# Patient Record
Sex: MALE | Race: WHITE
[De-identification: names, ages, dates, MRNs, and addresses within clinical notes are randomized per-mention and may not be internally consistent; named-entity substitution may affect disease eponyms.]

---

## 2017-07-29 NOTE — UC
Throat Pain/Nasal Kennedy HPI





- HPI Summary


HPI Summary: 





THREE DAYS OF COUGH, SINUS CONGESTION AND LARYNGITIS. 





- History of Current Complaint


Chief Complaint: UCGeneralIllness


Stated Complaint: LARYNGITIS/CONGESTION


Time Seen by Provider: 07/28/17 18:13


Hx Obtained From: Patient


Onset/Duration: Gradual Onset, Lasting Days, Still Present


Severity: Moderate


Pain Intensity: 0


Pain Scale Used: 0-10 Numeric


Cough: Nonproductive


Associated Signs & Symptoms: Positive: Hoarseness





- Allergies/Home Medications


Allergies/Adverse Reactions: 


 Allergies











Allergy/AdvReac Type Severity Reaction Status Date / Time


 


Propafenone [From Rythmol] AdvReac  Dizziness Verified 07/28/17 18:19


 


dandelion resolved Allergy  Eyes Uncoded 07/28/17 18:19





   Itchy/Swollen/Red/Watery  














PMH/Surg Hx/FS Hx/Imm Hx


Previously Healthy: Yes





- Surgical History


Surgical History: Yes


Surgery Procedure, Year, and Place: 2013-Right MENISCUS REPAIR-2013 [place].  

2009- SURGERY BLADDER CANCER.  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  kidney stone 

removal





- Family History


Known Family History: Positive: Cardiac Disease, Hypertension





- Social History


Occupation: Retired


Lives: With Family


Alcohol Use: Daily


Alcohol Amount: 2 Martini drinks daily


Substance Use Type: None


Smoking Status (MU): Former Smoker


When Did the Patient Quit Smoking/Using Tobacco: 20+ years ago





- Immunization History


Most Recent Tetanus Shot: UNKNOWN





Review of Systems


Constitutional: Negative


Skin: Negative


Eyes: Negative


ENT: Sore Throat, Sinus Congestion


Respiratory: Cough


Cardiovascular: Negative


Gastrointestinal: Negative


Genitourinary: Negative


Motor: Negative


Neurovascular: Negative


Musculoskeletal: Negative


Neurological: Negative


Psychological: Negative


All Other Systems Reviewed And Are Negative: Yes





Physical Exam


Triage Information Reviewed: Yes


Appearance: Well-Appearing, No Pain Distress, Well-Nourished


Vital Signs: 


 Initial Vital Signs











Temp  99.8 F   07/28/17 18:10


 


Pulse  80   07/28/17 18:10


 


Resp  20   07/28/17 18:10


 


BP  174/76   07/28/17 18:10


 


Pulse Ox  94   07/28/17 18:10











Vital Signs Reviewed: Yes


Eye Exam: Normal


ENT: Positive: Pharyngeal erythema, TM dull


Dental Exam: Normal


Neck: Positive: Supple, Nontender, No Lymphadenopathy


Respiratory Exam: Other - COUGH


Respiratory: Positive: Chest non-tender, Lungs clear, Normal breath sounds, No 

respiratory distress


Cardiovascular Exam: Normal


Cardiovascular: Positive: RRR, Brisk Capillary Refill


Abdominal Exam: Normal


Musculoskeletal Exam: Normal


Neurological Exam: Normal


Psychological Exam: Normal


Skin Exam: Normal





Throat Pain/Nasal Course/Dx





- Differential Dx/Diagnosis


Differential Diagnosis/HQI/PQRI: Laryngitis, Pharyngitis, Sinusitis, URI


Provider Diagnoses: SINUSITIS; LARYNGITIS





Discharge





- Discharge Plan


Condition: Stable


Disposition: HOME


Prescriptions: 


Amoxicillin/Clavulanate TAB* [Augmentin *] 875 mg PO BID #20 tab


Benzonatate CAP* [Tessalon 100 MG CAP*] 100 mg PO TID PRN #15 cap


 PRN Reason: Cough


Patient Education Materials:  Sinusitis (ED), Laryngitis (ED), Acute Cough (ED)


Referrals: 


Yamileth Curiel MD [Primary Care Provider] -

## 2018-02-13 NOTE — RAD
Edited for charges.



INDICATION: Chest pain



COMPARISON: None

 

TECHNIQUE: SPECT imaging was performed. Rest images were acquired following the

intravenous injection of 10 millicuries of technetium 99m tetrofosmin at  1057 
hours. At 

1227 hours stress images were acquired following the intravenous administration 
of 25.38

millicuries of technetium 99m tetrofosmin.



The patient received intravenous Lexiscan prior to the stress image acquisition.



CT attenuation correction was not possible as the patient could not elevate 
arms above his

head.



FINDINGS: There are no defects of the stress-induced or fixed nature. The 
cardiac chamber

size is normal. There are no wall motion abnormalities. The ejection fraction is

calculated at 68% during stress.



IMPRESSION:  No scintigraphic evidence of ischemia or infarction. 



ASSESSMENT:  HIGH-RISK, INTERMEDIATE-RISK, OR LOW-RISK



Based on imaging criteria from ACC/AHA 2002 Guideline Update for the Management 
of

Patients With Chronic Stable Angina Table 23. Noninvasive Risk Stratification.



MTDD

## 2018-02-13 NOTE — RAD
HISTORY: Chest pain



COMPARISONS: July 18, 2017



VIEWS: 1: frontal portable view of the chest at 2:00 AM



FINDINGS:

LINES AND TUBES: None.

CARDIOMEDIASTINAL SILHOUETTE: The cardiac silhouette is enlarged. The cardiomediastinal

silhouette is otherwise normal for portable technique.

PLEURA: The costophrenic angles are sharp. No pleural abnormalities are noted.

LUNG PARENCHYMA: The lungs are clear.

ABDOMEN: The upper abdomen is clear. There is no subphrenic gas.

BONES AND SOFT TISSUES: No bone or soft tissue abnormalities are noted.



IMPRESSION: CARDIOMEGALY.

## 2018-02-13 NOTE — RAD
HISTORY: Lower extremity edema



COMPARISONS: None relevant



TECHNIQUE: Multiple transverse and longitudinal ultrasound images were obtained of the

bilateral lower extremities  from the level of the common femoral vein inferiorly through

to the infrapopliteal veins  using grayscale, color Doppler, and spectral Doppler imaging

with and without compression and with augmentation.    



FINDINGS:

VEINS: The venous system of the bilateral lower extremities  is compressible throughout

its course, with normal flow on color Doppler imaging and normal response to augmentation

on spectral Doppler imaging.



SOFT TISSUES: Unremarkable.



OTHER FINDINGS: None.



IMPRESSION: 

NO RIGHT LOWER EXTREMITY DEEP VEIN THROMBOSIS.

NO LEFT LOWER EXTREMITY DEEP VEIN THROMBOSIS

## 2018-02-13 NOTE — HP
CC:  Yamileth Curiel MD.

 

HISTORY AND PHYSICAL:

 

DATE OF ADMISSION:  18

 

TIME OF EVALUATION:  0400

 

PRIMARY CARE PHYSICIAN:  Yamileth Curiel MD.

 

CHIEF COMPLAINT:  Chest pain.

 

HISTORY OF PRESENT ILLNESS:  This is an 82-year-old male with past medical history of atrial fibrilla
tion, on Coumadin; hypertension, who presents to the emergency room with substernal epigastric pleuri
tic pain.  The patient states the symptoms began yesterday morning and has not unproved.  He states 2
 days prior to this, he has had congestion, postnasal drip with a mild cough.  No associated shortnes
s of breath.  No nausea, no diaphoresis.  He has an O2 sat at home, and it has been greater than 90. 
 He has had COPD, but he has no inhalers and no inhaler use.  He denies any change in his diet.  No i
ncrease in burping.  He was shoveling a few days ago, when the snow storm hit.  Otherwise, no exertio
nal activity.  No recent weight changes, no fevers, no recent changes in his medications.  The danny
t states he had a stress test here every year, but was not able to find that documentation other than
 one from .  Otherwise, remainder of review of systems negative.  In the emergency room, the kate
ent had labs, imaging.  He was given 4 mg of morphine, Zofran, aspirin, and was referred to the [facility] for further evaluation.

 

PAST MEDICAL HISTORY:

1.  Atrial fibrillation, on anticoagulation.

2.  Hypertension.

3.  COPD, on room air.

4.  History of bladder cancer, status post surgery in .

5.  Prostate cancer, status post surgery in .

6.  History of nephrolithiasis.

7.  History of right knee surgery.

 

MEDICATIONS:

1.  Multivitamin p.o. daily.

2.  Simvastatin 20 mg p.o. daily.

3.  Timolol maleate 0.25% one drop to each eye b.i.d. for glaucoma.

4.  Pilocarpine 0.5% one drop in both eyes once per month.

5.  Lisinopril 20 mg p.o. daily.

6.  Warfarin 2.5 mg Tuesday, Thursday, and Saturday; 1.25 mg on , Monday, Wednesday, Friday.

7.  Potassium chloride 20 mEq p.o. daily.

8.  Lasix 20 mg p.o. daily.

 

ALLERGIES:  PROPAFENONE, dizziness; DANDELION.

 

FAMILY HISTORY:  Mother  at age 73 from old age.  Father  at age 97 from emphysema.

 

SOCIAL HISTORY:  The patient states he recently relocated from [place].  He lives at home with his wi
fe who is his healthcare proxy.  He quit smoking 25 years ago, at that time he smoked 1 to 2 packs pe
r day for 25 years.  He has 1 martini vodka in the evening time.  Code status discussed, he is not mckay
re, will remain full code until he follows up with his primary to discuss further.

 

REVIEW OF SYSTEMS:  A 14-point review of systems as mentioned in the HPI, otherwise negative.

 

                               PHYSICAL EXAMINATION

 

GENERAL:  No acute distress, resting comfortably.

 

VITAL SIGNS:  Temp 98.3, pulse rate 87, respiratory rate 14, oxygen saturation 93% on 2 L, blood pres
sure 115/60.

 

HEENT:  Head:  Normocephalic.  Pupils are equal and reactive.  Anicteric. Oropharynx, mucous membrane
s moist.

 

NECK:  Supple.  No lymphadenopathy.

 

RESPIRATORY:  Diminished breath sounds.  No wheezing, rhonchi, or rales.

 

CARDIAC:  Irregularly irregular rate and rhythm.

 

ABDOMEN:  Soft, nontender, nondistended.

 

EXTREMITIES:  The patient with multiple lower extremity varicosities.  Distant pulses.  No edema.

 

NEUROLOGIC:  Alert and oriented x3.  No focal neurologic deficits.

 

 DIAGNOSTIC STUDIES/LAB DATA:  White count 9.3, hemoglobin 15.1, hematocrit 46, platelets 162.  INR 2
.4.  Sodium 134, potassium 4.2, chloride 99, bicarb 29, BUN 19, creatinine 0.92, glucose 151.  Tropon
in 0.01.

 

Radiographic data:  Chest x-ray, no acute findings on wet read.  EKG showed atrial fibrillation with 
a rate of 98.

 

ASSESSMENT AND PLAN:  This is an 82-year-old male with past medical history of atrial fibrillation, o
n Coumadin; hypertension, who presents to the emergency room with pleuritic pain.

 

1.  Pleuritic pain.  Assessment:  The patient's  initial workup is unremarkable. It is in the setting
 of congestion, could be costochondritis, or related to a viral syndrome.  He does have risk factors.
  It was unreasonable to rule him out for acute coronary syndrome.  Plan:  We will trend his troponin
.  Check a lipid panel. Place him on a baby aspirin. Order a nuclear stress test.

2.  Chronic medical problems:  Atrial fibrillation.  Assessment:  He is anticoagulated.  He is not on
 any medications for rate control.  We would recommend starting him on a beta-blocker after his stres
s test.

3.  Hypertension.  Continue his home regimen.

4.  FEN.  We will keep the patient n.p.o.

5.  DVT prophylaxis.  The patient is moderate risk.  He is on Coumadin.

6.  Code status, full code.  Recommend followup with his primary care physician regarding this.

 

PATIENT TIME:  Greater than 45 minutes spent doing the history and physical, more than half time spen
t in direct patient contact.

 

 

 

[id number]/CPS #: [id number]

## 2018-02-14 NOTE — DS
CC:  Taylor Gerber MD  *

 

DISCHARGE SUMMARY:

 

DATE OF ADMISSION:  02/13/18

 

DATE OF DISCHARGE:  02/13/18

 

ADMITTING PROVIDER:  Vanessa Wick MD

 

ATTENDING PHYSICIAN:  Kevin Hogan MD

 

PRIMARY CARE PROVIDER:  Yamileth Curiel MD

 

CHIEF COMPLAINT:  Pleuritic sharp chest pain.

 

PRINCIPAL DIAGNOSIS:  Acute coronary artery syndrome ruled out; pleuritic chest 
pain likely secondary to viral illness.

 

HISTORY OF PRESENT ILLNESS AND HOSPITAL COURSE:  Jorge Castañeda is an 82-
year- old male with past medical history of atrial fibrillation, on Coumadin; 
hypertension; COPD, on 2 L of oxygen at night; bladder cancer, status post 
surgery in 2009; prostate cancer, status post surgery in 1999; history of 
nephrolithiasis who presented with upper substernal pleuritic pain radiating to 
his upper back. Two days prior to admission, he had some congestion, postnasal 
drip, mild cough, fevers and chills.  No associated shortness of breath.  His 
O2 saturation has been greater than 98% at home when he measured it.  Despite 
his chronic hypoxic nocturnal respiratory failure, he is on no inhalers.  He 
was shoveling snow a few days prior to admission, otherwise no exertional 
activity, weight changes.  He was admitted for ACS rule out.  His initial chest 
x-ray showed no acute findings.  His initial troponin was 0.01.  It increased 
to 0.04, then 0.05, and down to 0.04 again.  He had a nuclear stress test, 
which showed no evidence of ischemia or infarction.  He had complaints of 
chronic right lower extremity edema and a duplex ultrasound bilaterally did not 
show any evidence of DVT.  His physical exam was more consistent with varicose 
veins, worse on the right leg.  Of note, his INR was in the therapeutic range, 
2.4.  He had a LDL of 74, HDL of 50, BNP of 163.  ACS having been ruled out and 
likely this is pleuritic pain secondary to his upper respiratory infections in 
recent days and possible viral insult.  The patient was considered stable for 
discharge with further followup with Dr. Curiel.

 

DISCHARGE MEDICATIONS:  Include:

1.  Acetaminophen 650 mg p.o. q.4 hours (new).

2.  Docusate 100 mg p.o. b.i.d. (new).

3.  Lasix 20 mg p.o. daily.

4.  Lisinopril 20 mg p.o. daily.

5.  Multivitamin 1 tab p.o. daily.

6.  Pilocarpine 0.5% ophthalmic solution 1 drop monthly.

9.  Potassium chloride tablet 20 mEq p.o. daily.

10.  Senna tab 1 tab p.o. b.i.d. p.r.n. (new).

11.  Simvastatin 20 mg p.o. q.a.m.

12.  Timolol 0.25% ophthalmic solution 1 drop both eyes b.i.d.

13.  Tramadol 25 mg p.o. q.6 hours p.r.n. (new).

14.  Warfarin 1.25 mg Sunday, Monday, Wednesday, Friday and 2.5 mg Tuesday, 
Thursday, Saturday.

 

DISCHARGE DIET:  Heart healthy.

 

ACTIVITY LEVEL:  No restrictions.

 

FOLLOWUP:  Please follow up with Dr. Yamileth Curiel within 5 days of discharge.

 

TIME SPENT:  On discharge 35 minutes.

 

 [id number]/CPS #: [id number]

MTDD

## 2018-07-12 NOTE — ED
Lower Extremity





- History of Current Complaint


Chief Complaint: UCLowerExtremity


Stated Complaint: RIGHT LEG COMPLAINT


Time Seen by Provider: 07/12/18 12:11


Hx Obtained From: Patient


Mechanism Of Injury: Unknown


Onset/Duration: Days


Severity Initially: Mild


Severity Currently: Mild


Pain Intensity: 0


Associated Signs And Symptoms: Positive: Negative


Aggravating Factor(s): Nothing


Alleviating Factor(s): Nothing





- Allergies/Home Medications


Allergies/Adverse Reactions: 


 Allergies











Allergy/AdvReac Type Severity Reaction Status Date / Time


 


propafenone Allergy  Dizziness Verified 02/13/18 03:18


 


dandelion resolved Allergy  Eyes Uncoded 07/28/17 18:19





   Itchy/Swollen/Red/Watery  











Home Medications: 


 Home Medications





Oxygen Machine At Night No. 2  07/12/18 [History]


Simvastatin 20 mg PO QAM 07/12/18 [History Confirmed 07/12/18]











PMH/Surg Hx/FS Hx/Imm Hx


Endocrine/Hematology History: 


   Denies: Hx Diabetes


Cardiovascular History: Reports: Hx Angina, Hx Coronary Artery Disease - HIGH 

CHOLESTEROL, Hx Peripheral Vascular Disease - MILD PER MATT NOTE


   Denies: Other Cardiovascular Problems/Disorders


   Comment Only: Hx Hypertension - CONTROLED BY MEDS


Respiratory History: Reports: Hx Chronic Obstructive Pulmonary Disease (COPD)


   Denies: Hx Asthma


 History: Reports: Hx Kidney Stones, Other  Problems/Disorders - DX BLADDER 

[place] 2009


Musculoskeletal History: Reports: Hx Arthritis - RIGHT KNEE


Sensory History: Reports: Hx Cataracts, Hx Contacts or Glasses - READING, Hx 

Glaucoma - BILATERAL, Hx Hearing Aid - LEFT EAR, Hx Hearing Problem


Opthamlomology History: Reports: Hx Cataracts, Hx Contacts or Glasses - READING

, Hx Glaucoma - BILATERAL





- Cancer History


Cancer Type, Location and Year: BLADDER 1999.  PROSTATE.  Elim IRA





- Surgical History


Surgery Procedure, Year, and Place: 2013-Right MENISCUS REPAIR-2013 [place].  

2009- SURGERY BLADDER CANCER.  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  kidney stone 

removal


Hx Anesthesia Reactions: No


Infectious Disease History: No


Infectious Disease History: Reports: Hx Shingles


   Denies: Traveled Outside the [place] in Last 30 Days





- Family History


Known Family History: Positive: Cardiac Disease, Hypertension





- Social History


Alcohol Use: Daily


Alcohol Amount: 1-1 1/8 Martini drinks daily


Substance Use Type: Reports: None


Smoking Status (MU): Former Smoker





Review of Systems


Constitutional: Negative


Eyes: Negative


ENT: Negative


Cardiovascular: Other - dyspnea with exertion


Positive: Shortness Of Breath - unchanged


Gastrointestinal: Negative


Genitourinary: Negative


Skin: Other - small laceration right lower leg 


Neurological: Negative


Psychological: Normal


All Other Systems Reviewed And Are Negative: Yes





Physical Exam


Triage Information Reviewed: Yes


Vital Signs On Initial Exam: 


 Initial Vitals











Temp Pulse Resp BP Pulse Ox


 


 36.4 C   56   18   139/67   96 


 


 07/12/18 11:45  07/12/18 11:45  07/12/18 11:45  07/12/18 11:45  07/12/18 11:45











Vital Signs Reviewed: Yes


Appearance: Positive: Well-Appearing


Skin: Positive: Warm - small laceration right posterior leg, Other - small 

laceration of the right posterior calf without surrounding erythema or pain


Head/Face: Positive: Normal Head/Face Inspection


Eyes: Positive: Normal


ENT: Positive: Normal ENT inspection


Neck: Positive: Supple


Respiratory/Lung Sounds: Positive: Clear to Auscultation


Cardiovascular: Positive: Other - irregularly irregular


Abdomen Description: Positive: Nontender


Bowel Sounds: Positive: Present


Musculoskeletal: Positive: Edema Left - bilateral varicosities noted, Edema 

Right





Diagnostics





- Vital Signs


 Vital Signs











  Temp Pulse Resp BP Pulse Ox


 


 07/12/18 11:45  36.4 C  56  18  139/67  96














- Laboratory


Lab Statement: Any lab studies that have been ordered have been reviewed, and 

results considered in the medical decision making process.





Lower Extremity Course/Dx





- Diagnoses


Provider Diagnoses: 


 Laceration of leg








Discharge





- Sign-Out/Discharge


Documenting (check all that apply): Patient Departure





- Discharge Plan


Condition: Good


Disposition: HOME


Patient Education Materials:  Laceration (ED), Chronic Wound Care (ED)


Referrals: 


Yamileth Curiel MD [Primary Care Provider] - 





- Billing Disposition and Condition


Condition: GOOD


Disposition: Home

## 2018-08-15 ENCOUNTER — HOSPITAL ENCOUNTER (OUTPATIENT)
Dept: HOSPITAL 25 - CHICATH | Age: 83
Setting detail: OBSERVATION
LOS: 1 days | Discharge: HOME | End: 2018-08-16
Attending: SPECIALIST | Admitting: SPECIALIST
Payer: MEDICARE

## 2018-08-15 DIAGNOSIS — I10: ICD-10-CM

## 2018-08-15 DIAGNOSIS — M26.609: ICD-10-CM

## 2018-08-15 DIAGNOSIS — Z85.46: ICD-10-CM

## 2018-08-15 DIAGNOSIS — Q21.1: ICD-10-CM

## 2018-08-15 DIAGNOSIS — N20.0: ICD-10-CM

## 2018-08-15 DIAGNOSIS — I83.90: ICD-10-CM

## 2018-08-15 DIAGNOSIS — J44.9: ICD-10-CM

## 2018-08-15 DIAGNOSIS — N42.9: ICD-10-CM

## 2018-08-15 DIAGNOSIS — I71.4: ICD-10-CM

## 2018-08-15 DIAGNOSIS — I49.3: ICD-10-CM

## 2018-08-15 DIAGNOSIS — I48.2: Primary | ICD-10-CM

## 2018-08-15 PROCEDURE — G0378 HOSPITAL OBSERVATION PER HR: HCPCS

## 2018-08-15 PROCEDURE — C1898 LEAD, PMKR, OTHER THAN TRANS: HCPCS

## 2018-08-15 PROCEDURE — 80048 BASIC METABOLIC PNL TOTAL CA: CPT

## 2018-08-15 PROCEDURE — 93005 ELECTROCARDIOGRAM TRACING: CPT

## 2018-08-15 PROCEDURE — 99157 MOD SED OTHER PHYS/QHP EA: CPT

## 2018-08-15 PROCEDURE — C1786 PMKR, SINGLE, RATE-RESP: HCPCS

## 2018-08-15 PROCEDURE — 36415 COLL VENOUS BLD VENIPUNCTURE: CPT

## 2018-08-15 PROCEDURE — 71045 X-RAY EXAM CHEST 1 VIEW: CPT

## 2018-08-15 PROCEDURE — 33207 INSERT HEART PM VENTRICULAR: CPT

## 2018-08-15 PROCEDURE — 96365 THER/PROPH/DIAG IV INF INIT: CPT

## 2018-08-15 PROCEDURE — 99156 MOD SED OTH PHYS/QHP 5/>YRS: CPT

## 2018-08-15 PROCEDURE — 71046 X-RAY EXAM CHEST 2 VIEWS: CPT

## 2018-08-15 RX ADMIN — CEFAZOLIN SCH MLS/HR: 330 INJECTION, POWDER, FOR SOLUTION INTRAMUSCULAR; INTRAVENOUS at 16:25

## 2018-08-15 RX ADMIN — POTASSIUM CHLORIDE SCH MEQ: 1500 TABLET, EXTENDED RELEASE ORAL at 12:34

## 2018-08-15 RX ADMIN — CEFAZOLIN SCH MLS/HR: 330 INJECTION, POWDER, FOR SOLUTION INTRAMUSCULAR; INTRAVENOUS at 23:50

## 2018-08-15 RX ADMIN — FUROSEMIDE SCH MG: 20 TABLET ORAL at 12:34

## 2018-08-15 RX ADMIN — TIMOLOL MALEATE SCH DROP: 2.5 SOLUTION OPHTHALMIC at 20:45

## 2018-08-15 NOTE — RAD
INDICATION: Device implant



COMPARISON: February 13, 2018

 

TECHNIQUE: An AP portable view obtained at 1420 hours is submitted.



FINDINGS: 



Bones/Soft Tissues: There are no acute bony findings. There is recent left-sided cardiac

pacemaker placement



Cardiomediastinal: The cardiomediastinal silhouette is normal. 



Lungs: There are no infiltrates. There is no pneumothorax.



Pleura: There are no pleural effusions. 



Other: None



IMPRESSION:  LEFT CARDIAC PACEMAKER PLACEMENT. NO PNEUMOTHORAX.

## 2018-08-16 VITALS — DIASTOLIC BLOOD PRESSURE: 71 MMHG | SYSTOLIC BLOOD PRESSURE: 156 MMHG

## 2018-08-16 RX ADMIN — TIMOLOL MALEATE SCH DROP: 2.5 SOLUTION OPHTHALMIC at 09:21

## 2018-08-16 RX ADMIN — CEFAZOLIN SCH MLS/HR: 330 INJECTION, POWDER, FOR SOLUTION INTRAMUSCULAR; INTRAVENOUS at 07:44

## 2018-08-16 RX ADMIN — POTASSIUM CHLORIDE SCH MEQ: 1500 TABLET, EXTENDED RELEASE ORAL at 09:22

## 2018-08-16 RX ADMIN — FUROSEMIDE SCH MG: 20 TABLET ORAL at 09:22

## 2018-08-16 NOTE — DS
CC:  Dr. Yamileth Curiel*

 

DISCHARGE SUMMARY:

 

DATE OF ADMISSION:  08/15/2018.

 

DATE OF DISCHARGE:  2018.

 

HISTORY OF PRESENT ILLNESS AND HOSPITAL COURSE:  Mr. Castañeda is an 83-year-
old gentleman with longstanding chronic atrial fibrillation.  Recently, he was 
noted to have frequent PVCs, 26% of total and ventricular bigeminy.  He also 
had evidence of bradycardia with rate as well as 36 beats a minute with atrial 
fibrillation.  The decision was made to implant a single chamber pacemaker in 
order to add additional rate lowering agents, beta-blockers for his PVCs.

 

The patient underwent single chamber pacemaker implantation yesterday 08/15/18 
with an MRI compatible [employer] system.  His blood pressure was high on arrival 
with Lasix held but responded well to resumption of Lasix.

 

Overnight, the patient felt well.  He feels his breathing has improved since 
implantation of the device (although it should be noted he received both his 
usual oral 20 mg of Lasix and 10 mg of Lasix IV push yesterday).  It is 
possible that extra diuretics lead to improved breathing.

 

The patient denies any pain, shortness of breath, orthopnea, PND, or dizziness.

 

PAST MEDICAL HISTORY:  The patient has a past medical history of:

 

1.  Chronic atrial fibrillation.

2.  Hypertension.

3.  Patent foramen ovale.

4.  Thoracic aneurysm.

5.  COPD.

6.  Diminished diffusion capacity.

7.  Prostate disease ([place]).

8.  Bladder lesions.

9.  Lung nodule.

 

SOCIAL HISTORY:  The patient is , lives with his supportive wife.  
Stopped smoking in .  Drinks a bit of vodka daily and caffeine daily.  He 
is active, exercising regularly.

 

FAMILY HISTORY:  Significant for cancer.  Three siblings  from cancer.  His 
mother  of pancreatic cancer.  His father  at age 78 related to a vein 
closing up, history unclear.

 

PHYSICAL EXAMINATION:  On the day of discharge, the patient a fit appearing 
elderly gentleman, walking around the floors, in no acute distress.  Incision 
in the left subclavian fossa is well healed and no evidence of active bleeding 
or infection and no hematoma or ecchymosis in the pocket.  Breath sounds were 
clear in all fields. Coronary:  S1, S2, regular.

 

LABORATORY DATA:  Sodium 141, potassium 3.9, chloride 103, bicarb 33, BUN 20, 
creatinine 0.87, glucose 108.

 

Monitor shows atrial fibrillation, intermittent ventricular pacing and PVCs.

 

Chest x-ray yesterday and today showed good lead placements and no pneumothorax 
and no pulmonary infiltrates.

 

Device interrogation today confirmed he has a [employer] MRI compatible 
system.  The ventricle lead impedance is 550 ohms, R waves are sensed at 9.6 
millivolts with ventricular pacing threshold of 0.5 volts at 0.4 milliseconds.

 

MEDICATIONS AT THE TIME OF DISCHARGE:  Include new addition of Keflex 500 mg 
t.i.d. for 4 days and he is to continue his home medications of:

 

1.  Lipitor 10 mg a day.

2.  Lasix 20 mg a day.

3.  Prinivil 20 mg a day.

4.  Pilocarpine ophthalmic drops.

5.  Potassium 20 mEq a day.

6.  Timolol ophthalmic drops.

 

We will plan on adding metoprolol with his wound check next week.  He is 
resuming his Coumadin as well.

 

The patient was provided with oral and written instructions for care of his 
device and we will see him in 1 week's time for wound check and he knows to 
call pJudirjoe

 

 [id number]/CPS #: [id number]

VALERIE

## 2018-08-16 NOTE — RAD
INDICATION: Device implant



COMPARISON: August 15, 2018

 

TECHNIQUE: PA and lateral dual-energy views were obtained.



FINDINGS: 



Bones/Soft Tissues: There are no acute bony findings. There is recent left-sided cardiac

pacemaker placement. The appearance unchanged



Cardiomediastinal: The cardiac silhouette is mildly prominent. 



Lungs: There are no infiltrates. There is no pneumothorax.



Pleura: There are no pleural effusions. 



Other: None



IMPRESSION:  RECENT PLACEMENT OF LEFT-SIDED CARDIAC PACEMAKER. LUNGS CLEAR

## 2018-08-16 NOTE — OP
CC:  Dr. Yamileth Curiel; Dr. Ángel Menjivar*

 

OPERATIVE REPORT:

 

DATE OF OPERATION:  08/15/18 - Inpatient, room 434-01

 

DATE OF BIRTH:  [birth year]

 

SURGEON:  Taylor Gerber MD

 

PRE-OP DIAGNOSIS:  Atrial fibrillation with bradycardia and PVCs.

 

POST-OP DIAGNOSIS:  Atrial fibrillation with bradycardia and PVCs.

 

OPERATIVE PROCEDURE:  Single-chamber pacemaker implantation.

 

ESTIMATED BLOOD LOSS:  Less than 10 cc.

 

COMPLICATIONS:  None.

 

DESCRIPTION OF PROCEDURE:  The indications, risks, and benefits of the 
procedure were discussed with the patient in the presence of his wife and they 
were amenable to proceeding.  The patient had held his Coumadin for 5 days 
prior to the procedure and held his Lasix the morning of the procedure, but 
taken other meds.  The patient is right-handed and the left subclavian fossa 
was prepped and draped in the usual sterile fashion.  Throughout the procedure, 
the patient received a total of 7 mg of Versed and 50 mcg of fentanyl.  The 
patient received 20 cc of 1% lidocaine for local anesthesia.

 

Following the timeout, 10 cc of radiopaque dye was injected into the left upper 
extremity outlining the left axillary and left subclavian vein.  Following the 
local anesthesia, using a 10 blade knife, a 2.5 cm incision was made in the 
left subclavian fossa, and using Bovie and blunt dissection, it was extended to 
the level of the pectoralis muscle.

 

Additional lidocaine was infused inferiorly and medially, and using blunt 
dissection, a small pocket was fashioned.

 

Using a modified Seldinger technique, the left subclavian vein was cannulated 
and a guidewire inserted.  Using an introducer technique, the right ventricular 
lead was guided into the right ventricular apex and actively fixed in place.  
The patient had had significant bleeding at the site of the incision, but not 
with the pocket. Direct pressure was placed as well and the lead was sutured to 
the pocket using 0 silk suture and followed by irrigation.  Bleeding had stopped
, but I did take a third incision to wider with tissue to wrap around the lead 
as it came out of the vein to minimize future bleeding.  The pocket was then 
again copiously irrigated. Pacing and sensing thresholds again checked.  The 
lead was attached to the device and the device was placed in the pocket. The 
incision was closed with 2 layers of resorbable followed by staples and an 
external dressing.

 

FINDINGS:  The system is an MRI compatible [employer] system.  The RV lead is a 
[employer] model [id number], serial #[id number].  R waves were sensed at 7.1 
millivolts with a ventricular lead impedance of 685 ohms, and the ventricular 
pacing threshold of 0.5 volts at 0.4 milliseconds.

 

The generator is the [employer] model [id number], serial #[id number].  Leaving the 
Cath Lab, he was programmed in VVI mode at the rate of 60 beats a minute.

 

The patient had a transient drop in oxygen saturation, which responded to nasal 
cannula and repositioning of the airway.  He was hypertensive on arrival to the 
pacer lab with some improvement with anesthesia, but during the case, received 
10 mg of Lasix for diuresis for a combination of back bleeding and hypertension.

 

There are otherwise no complications and on transfer to the floor, the patient'
s systolic blood pressure is in the 130s, oxygen saturation is approximately 94
% on 2 L nasal cannula.

 

 [id number]/[facility] #: [id number]

[facility]

## 2018-12-21 ENCOUNTER — HOSPITAL ENCOUNTER (EMERGENCY)
Dept: HOSPITAL 25 - UCCORT | Age: 83
Discharge: HOME | End: 2018-12-21
Payer: MEDICARE

## 2018-12-21 VITALS — DIASTOLIC BLOOD PRESSURE: 77 MMHG | SYSTOLIC BLOOD PRESSURE: 155 MMHG

## 2018-12-21 DIAGNOSIS — J06.9: Primary | ICD-10-CM

## 2018-12-21 DIAGNOSIS — Z77.22: ICD-10-CM

## 2018-12-21 PROCEDURE — G0463 HOSPITAL OUTPT CLINIC VISIT: HCPCS

## 2018-12-21 PROCEDURE — 99211 OFF/OP EST MAY X REQ PHY/QHP: CPT

## 2018-12-21 NOTE — UC
Throat Pain/Nasal Kennedy HPI





- HPI Summary


HPI Summary: 





sore throat x 1 day


nasal congestion , pnd


no fever, no chills, no cough 





- History of Current Complaint


Chief Complaint: UCGeneralIllness


Stated Complaint: SORE THROAT


Time Seen by Provider: 12/21/18 08:20


Hx Obtained From: Patient


Onset/Duration: Gradual Onset, Lasting Days - 1, Still Present


Severity: Moderate


Pain Intensity: 2


Cough: None


Associated Signs & Symptoms: Positive: Nasal Discharge.  Negative: Dysphagia, 

FB Sensation, Drooling, Wheezing, Hoarseness, Sinus Discomfort, Fever, Vomiting

, Rash





- Allergies/Home Medications


Allergies/Adverse Reactions: 


 Allergies











Allergy/AdvReac Type Severity Reaction Status Date / Time


 


propafenone Allergy  Dizziness Verified 12/21/18 08:19














PMH/Surg Hx/FS Hx/Imm Hx





- Additional Past Medical History


Additional PMH: 





Dyslipidemia, Fluid Retention, Glaucoma


Cardiovascular History: Atrial Fibrillation


Respiratory History: COPD





- Surgical History


Surgical History: Yes


Surgery Procedure, Year, and Place: 2013-Right MENISCUS REPAIR-2013 [place].  

2009- SURGERY BLADDER CANCER.  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  kidney stone 

removal





- Family History


Known Family History: Positive: Cardiac Disease, Hypertension





- Social History


Alcohol Use: Daily


Alcohol Amount: Martini


Substance Use Type: None


Smoking Status (MU): Former Smoker


Length of Time of Smoking/Using Tobacco: 1-2 PPD x 25 Years


Have You Smoked in the Last Year: No


When Did the Patient Quit Smoking/Using Tobacco: 1985





- Immunization History


Most Recent Tetanus Shot: UNKNOWN





Review of Systems


All Other Systems Reviewed And Are Negative: Yes


Constitutional: Positive: Negative


Skin: Positive: Negative


Eyes: Positive: Negative


ENT: Positive: Sore Throat, Nasal Discharge


Respiratory: Positive: Negative


Cardiovascular: Positive: Negative


Is Patient Immunocompromised?: No





Physical Exam


Triage Information Reviewed: Yes


Appearance: Well-Appearing, No Pain Distress, Well-Nourished


Vital Signs: 


 Initial Vital Signs











Temp  98.7 F   12/21/18 08:16


 


Pulse  66   12/21/18 08:16


 


Resp  18   12/21/18 08:16


 


BP  155/77   12/21/18 08:16


 


Pulse Ox  95   12/21/18 08:16











Vital Signs Reviewed: Yes


Eye Exam: Normal


Eyes: Positive: Conjunctiva Clear


ENT: Positive: Normal ENT inspection, Hearing grossly normal, Pharyngeal 

erythema, Nasal drainage.  Negative: Nasal congestion, Tonsillar swelling, 

Tonsillar exudate


Neck: Positive: Supple, Nontender, No Lymphadenopathy


Respiratory: Positive: Chest non-tender, Lungs clear, Normal breath sounds


Cardiovascular: Positive: No Murmur, Other: - afib normal rate


Skin Exam: Normal





Throat Pain/Nasal Course/Dx





- Differential Dx/Diagnosis


Provider Diagnosis: 


 Viral pharyngitis








Discharge





- Sign-Out/Discharge


Documenting (check all that apply): Patient Departure


All imaging exams completed and their final reports reviewed: No Studies





- Discharge Plan


Condition: Stable


Disposition: HOME


Patient Education Materials:  Pharyngitis (ED)


Referrals: 


Yamileth Curiel MD [Primary Care Provider] - If Needed


Additional Instructions: 


viral sore throat 





- Billing Disposition and Condition


Condition: STABLE


Disposition: Home

## 2020-03-11 ENCOUNTER — HOSPITAL ENCOUNTER (EMERGENCY)
Dept: HOSPITAL 25 - UCCORT | Age: 85
Discharge: HOME | End: 2020-03-11
Payer: MEDICARE

## 2020-03-11 VITALS — DIASTOLIC BLOOD PRESSURE: 67 MMHG | SYSTOLIC BLOOD PRESSURE: 145 MMHG
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
## 2020-03-11 DIAGNOSIS — Z88.8: ICD-10-CM

## 2020-03-11 DIAGNOSIS — S60.122A: ICD-10-CM
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
## 2020-03-11 DIAGNOSIS — S61.411A: Primary | ICD-10-CM

## 2020-03-11 DIAGNOSIS — Z79.01: ICD-10-CM

## 2020-03-11 DIAGNOSIS — Z95.0: ICD-10-CM

## 2020-03-11 DIAGNOSIS — Z87.891: ICD-10-CM

## 2020-03-11 PROCEDURE — G0463 HOSPITAL OUTPT CLINIC VISIT: HCPCS

## 2020-03-11 PROCEDURE — 99211 OFF/OP EST MAY X REQ PHY/QHP: CPT

## 2020-03-11 NOTE — XMS REPORT
Continuity of Care Document (CCD)

 Created on:2020



Patient:Jorge Castañeda

Sex:Male

:1935

External Reference #:[id number]





Demographics







 Address  [address] [place], [place] [id number]

 

 Home Phone  [phone]

 

 Preferred Language  Unknown

 

 Marital Status  Unknown

 

 Christianity Affiliation  Unknown

 

 Race  Unknown

 

 Ethnic Group  Unknown









Author







 Name  Subhash Rod MD

 

 Address  [address] [place] [address]









Problems







 Active Problems  Provider  Date

 

 Essential hypertension    Onset: 2019

 

 Multiple premature ventricular complexes    Onset: 2018

 

 Chronic obstructive lung disease    Onset: 2018

 

 Pulmonary hypertension    Onset: 2017

 

 Bilateral hearing loss    Onset: 10/24/2017

 

 History of malignant neoplasm of prostate    Onset: 10/23/2017

 

 Atrial fibrillation    Onset: 2018

 

 Hyperlipidemia    Onset: 10/23/2017

 

 Kidney stone    Onset: 10/23/2017

 

 Cardiac pacemaker in situ  Subhash Rod MD  Onset: 02/10/2020









 Inactive Problems









 Pulmonary embolism    Onset: 2019









 Inactive: 2019







Social History







 Type  Date  Description  Comments

 

 Birth Sex    Unknown  

 

 Tobacco Use  Start: Unknown End: Unknown  Patient is a former smoker  quit in 


 

 Smoking Status  Reviewed: 20  Patient is a former smoker  quit in 







Allergies, Adverse Reactions, Alerts







 Active Allergies  Reaction  Severity  Comments  Date

 

 Diltiazem  leg edema, malaise.  Moderate    2019

 

 Metoprolol  wheezing, exacerbated COPD  Moderate    2019

 

 Rhythmol  dizziness  Moderate    2019







Medications







 Active Medications  SIG  Qnty  Indications  Ordering  Date



         Provider  

 

 Simvastatin  1  daily Oral  90tabs    Unknown  2019



            20mg          



 Tablets          



           

 

 Warfarin Sodium  Oral; M/W/F 2  90tabs    Unknown  2019



                2.5mg  Pills        



 Tablets          



           

 

 Furosemide  2 by mouth M W F 1  30tabs    Unknown  10/23/2018



           20mg  by mouth T TH Sat        



 Tablets  Sun        



           

 

 Warfarin Sodium  Oral; Take 1  90tabs    Unknown  2018



                2mg  T/TH/Sat/Sun        



 Tablets          



           

 

 Pilocarpine HCL  1 drop 4 times a  15units    Unknown  10/23/2017



                1%  day Ophthalmic        



 Solution          



           

 

 Timolol Maleate  1   Ophthalmic;      Unknown  10/23/2017



 Ophthalmic Gel  both eyes every 12        



 Forming  hours.        



        0.25% GFS          



           







Immunizations







 CPT Code  Status  Date  Vaccine  Lot #

 

 [id number]  Given  2020  Flu High Dose  [id number]

 

 15331  Given  10/23/2018  Flu High Dose  

 

 13647  Given  10/23/2017  Flu High Dose  







Vital Signs







 Date  Vital  Result  Comment

 

 2020  4:04pm  BP Systolic  116 mmHg  









 BP Diastolic  68 mmHg  

 

 Height  65.5 inches  5'5.50"

 

 Weight  168.00 lb  

 

 Heart Rate  60 /min  

 

 Body Temperature  96.8 F  

 

 O2 % BldC Oximetry  99 %  

 

 BMI (Body Mass Index)  27.5 kg/m2  









 2020  3:06pm  BP Systolic  116 mmHg  









 BP Diastolic  66 mmHg  

 

 Heart Rate  69 /min  

 

 Body Temperature  98.2 F  

 

 O2 % BldC Oximetry  98 %  







Results







 Test  Acquired Date  Facility  Test  Result  H/L  Range  Note

 

 Comp Metabolic  2020  [facility]  Sodium  143 mmol/L  Normal  
135-145  



 Panel    [address]          



     [place] [address] [phone]          









 Potassium  4.1 mmol/L  Normal  3.5-5.0  

 

 Chloride  103 mmol/L  Normal  101-111  

 

 Co2 Carbon Dioxide  34 mmol/L  High  22-32  

 

 Anion Gap  6 mmol/L  Normal  2-11  

 

 Glucose  136 mg/dL  High    

 

 Blood Urea Nitrogen  24 mg/dL  Normal  6-24  

 

 Creatinine  1.12 mg/dL  Normal  0.67-1.17  

 

 BUN/Creatinine Ratio  21.4  High  8-20  

 

 Calcium  9.6 mg/dL  Normal  8.6-10.3  

 

 Total Protein  7.0 g/dL  Normal  6.4-8.9  

 

 Albumin  4.2 g/dL  Normal  3.2-5.2  

 

 Globulin  2.8 g/dL  Normal  2-4  

 

 Albumin/Globulin Ratio  1.5  Normal  1-3  

 

 Total Bilirubin  0.90 mg/dL  Normal  0.2-1.0  

 

 Alkaline Phosphatase  107 U/L  High    

 

 Alt  23 U/L  Normal  7-52  

 

 Ast  31 U/L  Normal  13-39  

 

 Egfr Non-  62.5    >60  

 

 Egfr   75.6    >60  1









 INR/Protime  2020  [facility]  INR  1.78  High  0.82-1.09  2, 
3



     [address]          



     [place] [address] [phone]          

 

 Laboratory test  2020  [facility] INR  2.5      



 finding    (   )-   -          

 

 Comp Metabolic  2020  [facility]  Sodium  140  Normal  135-
145  



 Panel    [address]    mmol/L      



     [place] [address]          



     [phone]          









 Potassium  4.4 mmol/L  Normal  3.5-5.0  

 

 Chloride  98 mmol/L  Low  101-111  

 

 Co2 Carbon Dioxide  35 mmol/L  High  22-32  

 

 Anion Gap  7 mmol/L  Normal  2-11  

 

 Glucose  103 mg/dL  High    

 

 Blood Urea Nitrogen  28 mg/dL  High  6-24  

 

 Creatinine  1.15 mg/dL  Normal  0.67-1.17  

 

 BUN/Creatinine Ratio  24.3  High  8-20  

 

 Calcium  9.8 mg/dL  Normal  8.6-10.3  

 

 Total Protein  6.9 g/dL  Normal  6.4-8.9  

 

 Albumin  4.2 g/dL  Normal  3.2-5.2  

 

 Globulin  2.7 g/dL  Normal  2-4  

 

 Albumin/Globulin Ratio  1.6  Normal  1-3  

 

 Total Bilirubin  0.90 mg/dL  Normal  0.2-1.0  

 

 Alkaline Phosphatase  98 U/L  Normal    

 

 Alt  21 U/L  Normal  7-52  

 

 Ast  30 U/L  Normal  13-39  

 

 Egfr Non-  60.6    >60  

 

 Egfr   73.3    >60  4









 INR/Protime  2020  [facility]  INR  2.83  High  0.82-1.09  5



     [address]          



     [place], [place] [id number] [phone]          

 

 CMP  01/15/2020  N2N/CCD Import  Albumin QN  3.8      



       Serum/Plasma        









 Alt - SGPT  33      

 

 Calcium Ser/Plasma Mass/Vol  9.3      

 

 Carbon Dioxide QN Ser/Plas  31      

 

 Chloride Serum/Plasma  102      

 

 Creatinine QN Serum MCNC  1.9      

 

 Glucose Serum  90      

 

 Alkaline Phosphatase QN Ser/PL  91      

 

 Potassium QN Ser/PL Mols/Vol  5.1      

 

 Protein Total QN Ser/Plas  7.8      

 

 Sodium QN Ser/Plasma  138      

 

 Ast - Sgot  23      

 

 BUN - Urea Nitrogen Ser/Plas  57      

 

 Bilirubin Total Mass/Vol  0.7      

 

 GFR   36      

 

 GFR   44      









 Laboratory test  2020  [facility]  B-Type  221 pg/mL  High  <=
100  



 finding    [address]  Natriuretic        



     [place] [address]  Peptide BNP        



     [phone]          

 

 Comp Metabolic  2020  [facility]  Sodium  139  Normal  135-
145  



 Panel    [address]    mmol/L      



     [place] [address] [phone]          









 Chloride  99 mmol/L  Low  101-111  

 

 Co2 Carbon Dioxide  32 mmol/L  Normal  22-32  

 

 Glucose  73 mg/dL  Normal    

 

 Blood Urea Nitrogen  48 mg/dL  High  6-24  

 

 Creatinine  1.75 mg/dL  High  0.67-1.17  

 

 BUN/Creatinine Ratio  27.4  High  8-20  

 

 Calcium  10.1 mg/dL  Normal  8.6-10.3  

 

 Total Protein  7.3 g/dL  Normal  6.4-8.9  

 

 Albumin  4.4 g/dL  Normal  3.2-5.2  

 

 Globulin  2.9 g/dL  Normal  2-4  

 

 Albumin/Globulin Ratio  1.5  Normal  1-3  

 

 Total Bilirubin  0.80 mg/dL  Normal  0.2-1.0  

 

 Alkaline Phosphatase  93 U/L  Normal    

 

 Alt  24 U/L  Normal  7-52  

 

 Ast  32 U/L  Normal  13-39  

 

 Egfr Non-  37.3    >60  

 

 Egfr   45.2    >60  6

 

 Potassium  6.0 mmol/L  High  3.5-5.0  

 

 Anion Gap  8 mmol/L  Normal  2-11  









 Laboratory test  2020  [facility]  Magnesium  1.8 mg/dL  Low  
1.9-2.7  



 finding    [address] [place] [address] [phone]          









 Thyroxine  6.85 g/dL  Normal  6.09-12.23  

 

 TSH (Thyroid Stim Horm)  3.53 mcIU/mL  Normal  0.34-5.60  

 

 Free T4 (Free Thyroxine)  1.09 ng/dL  Normal  0.61-1.12  









 INR/Protime  2020  [facility]  INR  2.85  High  0.82-1.09  7



     [address]          



     [place] [address] [phone]          

 

 INR/Protime  2020  [facility]  INR  3.25  High  0.82-1.09  8



     [address]  [place] [address] [phone]          

 

 INR/Protime  2019  [facility]  INR  2.04  High  0.82-1.09  9



     [address]          



     [place] [address] [phone]          

 

 INR/Protime  2019  [facility]  INR  2.08  High  0.82-1.09  10



     [address]          



     [place] [address] [phone]          

 

 INR/Protime  2019  [facility]  INR  1.82  High  0.82-1.09  11



     [address]          



     [place] [address]          



     [phone]          

 

 INR/Protime  2019  [facility]  INR  2.33  High  0.82-1.09  12



     [address]          



     [place] [address]          



     [phone]          

 

 INR/Protime  2019  [facility]  INR  2.54  High  0.82-1.09  13



     [address]          



     [place] [address]          



     [phone]          

 

 INR/Protime  10/31/2019  [facility]  INR  1.65  High  0.82-1.09  14



     [address] [place] [address]          



     [phone]          

 

 INR/Protime  10/24/2019  [facility]  INR  1.90  High  0.82-1.09  15



     [address] [place] [address]          



     [phone]          

 

 Protime W/ INR  10/17/2019  [facility]  INR  1.84  High  0.82-1.09  
16



     [address]          



     [place] [address]          



     [phone]          

 

 Protime W/ INR  10/03/2019  [facility]  INR  2.36  High  0.82-1.09  
17



     [address] [place] [address]          



     [phone]          

 

 Coumadin Dx  2019  N2N/CCD Import  Coumadin Dx  Pe      

 

 INR  2019  N2N/CCD Import  INR  1.45 _    -  

 

 INR Goal  2019  N2N/CCD Import  INR Goal  2.0 to      



         3.0      

 

 Recheck INR  2019  N2N/CCD Import  Recheck INR        



         9      

 

 INR  2019  N2N/CCD Import  INR  1.45 _  High  0.82-1.09  









 1  *******Because ethnic data is not always readily available,



   this report includes an eGFR for both -Americans and



   non- Americans.****



   The National Kidney Disease Education Program (NKDEP) does



   not endorse the use of the MDRD equation for patients that



   are not between the ages of 18 and 70, are pregnant, have



   extremes of body size, muscle mass, or nutritional status,



   or are non- or non-.



   According to the National Kidney Foundation, irrespective of



   diagnosis, the stage of the disease is based on the level of



   kidney function:



   Stage Description                      GFR(mL/min/1.73 m(2))



   1     Kidney damage with normal or decreased GFR       90



   2     Kidney damage with mild decrease in GFR          60-89



   3     Moderate decrease in GFR                         30-59



   4     Severe decrease in GFR                           15-29



   5     Kidney failure                       <15 (or dialysis)

 

 2  ORDERED 8..19  EXPIRES ..20

 

 3  Standard intensity warfarin therapeutic range: 2.0-3.0



   High intensity warfarin therapeutic range: 2.5-3.5

 

 4  *******Because ethnic data is not always readily available,



   this report includes an eGFR for both -Americans and



   non- Americans.****



   The National Kidney Disease Education Program (NKDEP) does



   not endorse the use of the MDRD equation for patients that



   are not between the ages of 18 and 70, are pregnant, have



   extremes of body size, muscle mass, or nutritional status,



   or are non- or non-.



   According to the National Kidney Foundation, irrespective of



   diagnosis, the stage of the disease is based on the level of



   kidney function:



   Stage Description                      GFR(mL/min/1.73 m(2))



   1     Kidney damage with normal or decreased GFR       90



   2     Kidney damage with mild decrease in GFR          60-89



   3     Moderate decrease in GFR                         30-59



   4     Severe decrease in GFR                           15-29



   5     Kidney failure                       <15 (or dialysis)

 

 5  Standard intensity warfarin therapeutic range: 2.0-3.0



   High intensity warfarin therapeutic range: 2.5-3.5

 

 6  *******Because ethnic data is not always readily available,



   this report includes an eGFR for both -Americans and



   non- Americans.****



   The National Kidney Disease Education Program (NKDEP) does



   not endorse the use of the MDRD equation for patients that



   are not between the ages of 18 and 70, are pregnant, have



   extremes of body size, muscle mass, or nutritional status,



   or are non- or non-.



   According to the National Kidney Foundation, irrespective of



   diagnosis, the stage of the disease is based on the level of



   kidney function:



   Stage Description                      GFR(mL/min/1.73 m(2))



   1     Kidney damage with normal or decreased GFR       90



   2     Kidney damage with mild decrease in GFR          60-89



   3     Moderate decrease in GFR                         30-59



   4     Severe decrease in GFR                           15-29



   5     Kidney failure                       <15 (or dialysis)

 

 7  Standard intensity warfarin therapeutic range: 2.0-3.0



   High intensity warfarin therapeutic range: 2.5-3.5

 

 8  Standard intensity warfarin therapeutic range: 2.0-3.0



   High intensity warfarin therapeutic range: 2.5-3.5

 

 9  Standard intensity warfarin therapeutic range: 2.0-3.0



   High intensity warfarin therapeutic range: 2.5-3.5

 

 10  Standard intensity warfarin therapeutic range: 2.0-3.0



   High intensity warfarin therapeutic range: 2.5-3.5

 

 11  Standard intensity warfarin therapeutic range: 2.0-3.0



   High intensity warfarin therapeutic range: 2.5-3.5

 

 12  Standard intensity warfarin therapeutic range: 2.0-3.0



   High intensity warfarin therapeutic range: 2.5-3.5

 

 13  Standard intensity warfarin therapeutic range: 2.0-3.0



   High intensity warfarin therapeutic range: 2.5-3.5

 

 14  Standard intensity warfarin therapeutic range: 2.0-3.0



   High intensity warfarin therapeutic range: 2.5-3.5

 

 15  Standard intensity warfarin therapeutic range: 2.0-3.0



   High intensity warfarin therapeutic range: 2.5-3.5

 

 16  Standard intensity warfarin therapeutic range: 2.0-3.0



   High intensity warfarin therapeutic range: 2.5-3.5

 

 17  Standard intensity warfarin therapeutic range: 2.0-3.0



   High intensity warfarin therapeutic range: 2.5-3.5







Procedures







 Date  Code  Description  Status

 

 2020  72934  Anticoagulant MGMT For Patient Taking Warfarin, Inc Review  
Completed



     & Intr  

 

 2020  65280  Remove Impact Cerumen Irrigati  Completed

 

 2020  82216  Anticoagulant MGMT For Patient Taking Warfarin, Inc Review  
Completed



     & Intr  

 

 2020  88722  Remove Impact Cerumen Irrigati  Completed

 

 2020  99660  Remove Impact Cerumen Irrigati  Completed

 

 2020  47982  Anticoagulant MGMT For Patient Taking Warfarin, Inc Review  
Completed



     & Intr  

 

 2020  55078  Remove Impact Cerumen Irrigati  Completed

 

 2020  31838  Anticoagulant MGMT For Patient Taking Warfarin, Inc Review  
Completed



     & Intr  

 

 2020  04345  Anticoagulant MGMT For Patient Taking Warfarin, Inc Review  
Completed



     & Intr  

 

 2020  83055  Anticoagulant MGMT For Patient Taking Warfarin, Inc Review  
Completed



     & Intr  

 

 2019  88791  Anticoagulant MGMT For Patient Taking Warfarin, Inc Review  
Completed



     & Intr  

 

 12/10/2019  90226  Anticoagulant MGMT For Patient Taking Warfarin, Inc Review  
Completed



     & Intr  

 

 2019  97969  Anticoagulant MGMT For Patient Taking Warfarin, Inc Review  
Completed



     & Intr  

 

 2019  07888  Anticoagulant MGMT For Patient Taking Warfarin, Inc Review  
Completed



     & Intr  

 

 2019  72062  Anticoagulant MGMT For Patient Taking Warfarin, Inc Review  
Completed



     & Intr  

 

 10/25/2019  71803  Anticoagulant MGMT For Patient Taking Warfarin, Inc Review  
Completed



     & Intr  

 

 2019  19260  Anticoagulant MGMT For Patient Taking Warfarin, Inc Review  
Completed



     & Intr  







[employer]







 Description

 

 No Information Available







Encounters







 Type  Date  Location  Provider  Dx  Diagnosis

 

 Office Visit  2020  [facility] Darron Castellanos MD  H61.23  Impacted 
cerumen,



   3:45p        bilateral

 

 Office Visit  2020  [facility] Darron Castellanos MD  I10  Essential (primary
)



   3:00p        hypertension









 J30.89  Other allergic rhinitis

 

 Z23  Encounter for immunization







Assessments







 Date  Code  Description  Provider

 

 2020  I48.19  Other persistent atrial fibrillation  Nurse

 

 2020  H61.23  Impacted cerumen, bilateral  Nurse

 

 2020  H61.23  Impacted cerumen, bilateral  Carin Castellanos MD

 

 2020  I10  Essential (primary) hypertension  Carin Castellanos MD

 

 2020  J30.89  Other allergic rhinitis  Carin Castellanos MD

 

 2020  Z23  Encounter for immunization  Carin Castellanos MD

 

 2019  I48.19  Other persistent atrial fibrillation  Subhash Rod MD







Plan of Treatment

No Information Available



Functional Status







 Description

 

 No Information Available







Mental Status







 Description

 

 No Information Available







Referrals







 Description

 

 No Information Available

## 2020-03-11 NOTE — XMS REPORT
Continuity of Care Document (CCD)

 Created on:2020



Patient:Jorge Castañeda

Sex:Male

:1935

External Reference #:[id number]





Demographics







 Address  [address] [place] [address]

 

 Home Phone  [phone]

 

 Preferred Language  Unknown

 

 Marital Status  Unknown

 

 [employer] Affiliation  Unknown

 

 Race  Unknown

 

 Ethnic Group  Unknown









Author







 Name  Carin Castellanos MD

 

 Address  [address] [place] [address]









Problems







 Active Problems  Provider  Date

 

 Essential hypertension    Onset: 2019

 

 Multiple premature ventricular complexes    Onset: 2018

 

 Chronic obstructive lung disease    Onset: 2018

 

 Pulmonary hypertension    Onset: 2017

 

 Bilateral hearing loss    Onset: 10/24/2017

 

 History of malignant neoplasm of prostate    Onset: 10/23/2017

 

 Hyperlipidemia    Onset: 10/23/2017

 

 Kidney stone    Onset: 10/23/2017









 Inactive Problems









 Pulmonary embolism    Onset: 2019









 Inactive: 2019







Social History







 Type  Date  Description  Comments

 

 Birth Sex    Unknown  

 

 Tobacco Use  Start: Unknown End: Unknown  Patient is a former smoker  quit in 


 

 Smoking Status  Reviewed: 20  Patient is a former smoker  quit in 







Allergies, Adverse Reactions, Alerts







 Active Allergies  Reaction  Severity  Comments  Date

 

 Diltiazem  leg edema, malaise.  Moderate    2019

 

 Metoprolol  wheezing, exacerbated COPD  Moderate    2019

 

 Rhythmol  dizziness  Moderate    2019







Medications







 Active Medications  SIG  Qnty  Indications  Ordering  Date



         Provider  

 

 Simvastatin  1  daily Oral  90tabs    Unknown  2019



            20mg          



 Tablets          



           

 

 Warfarin Sodium  Oral; M/W/F 2  90tabs    Unknown  2019



                2.5mg  Pills        



 Tablets          



           

 

 Furosemide  2 by mouth M W F 1  30tabs    Unknown  10/23/2018



           20mg  by mouth T TH Sat        



 Tablets  Sun        



           

 

 Warfarin Sodium  Oral; Take 1  90tabs    Unknown  2018



                2mg  T/TH/Sat/Sun        



 Tablets          



           

 

 Pilocarpine HCL  1 drop 4 times a  15units    Unknown  10/23/2017



                1%  day Ophthalmic        



 Solution          



           

 

 Timolol Maleate  1   Ophthalmic;      Unknown  10/23/2017



 Ophthalmic Gel  both eyes every 12        



 Forming  hours.        



        0.25% GFS          



           







Immunizations







 CPT Code  Status  Date  Vaccine  Lot #

 

 [id number]  Given  2020  Flu High Dose  [id number]

 

 [id number]  Given  10/23/2018  Flu High Dose  

 

 [id number]  Given  10/23/2017  Flu High Dose  







Vital Signs







 Date  Vital  Result  Comment

 

 2020  4:04pm  BP Systolic  116 mmHg  









 BP Diastolic  68 mmHg  

 

 Height  65.5 inches  5'5.50"

 

 Weight  168.00 lb  

 

 Heart Rate  60 /min  

 

 Body Temperature  96.8 F  

 

 O2 % BldC Oximetry  99 %  

 

 BMI (Body Mass Index)  27.5 kg/m2  









 2020  3:06pm  BP Systolic  116 mmHg  









 BP Diastolic  66 mmHg  

 

 Heart Rate  69 /min  

 

 Body Temperature  98.2 F  

 

 O2 % BldC Oximetry  98 %  







Results







 Test  Acquired Date  Facility  Test  Result  H/L  Range  Note

 

 INR/Protime  2020  [facility]  INR  2.83  High  0.82-1.09  1, 
2



     [address]          



     [place] [address] [phone]          

 

 CMP  01/15/2020  N2N/CCD Import  Albumin QN  3.8      



       Serum/Plasma        









 Alt - SGPT  33      

 

 Calcium Ser/Plasma Mass/Vol  9.3      

 

 Carbon Dioxide QN Ser/Plas  31      

 

 Chloride Serum/Plasma  102      

 

 Creatinine QN Serum MCNC  1.9      

 

 Glucose Serum  90      

 

 Alkaline Phosphatase QN Ser/PL  91      

 

 Potassium QN Ser/PL Mols/Vol  5.1      

 

 Protein Total QN Ser/Plas  7.8      

 

 Sodium QN Ser/Plasma  138      

 

 Ast - Sgot  23      

 

 BUN - Urea Nitrogen Ser/Plas  57      

 

 Bilirubin Total Mass/Vol  0.7      

 

 GFR   36      

 

 GFR   44      









 Laboratory test  2020  [facility]  B-Type  221 pg/mL  High  <=
100  



 finding    [address]  Natriuretic        



     [place] [address]  Peptide BNP        



     [phone]          

 

 Comp Metabolic  2020  [facility]  Sodium  139  Normal  135-
145  



 Panel    [address]    mmol/L      



     [place] [address]          



     [phone]          









 Chloride  99 mmol/L  Low  101-111  

 

 Co2 Carbon Dioxide  32 mmol/L  Normal  22-32  

 

 Glucose  73 mg/dL  Normal    

 

 Blood Urea Nitrogen  48 mg/dL  High  6-24  

 

 Creatinine  1.75 mg/dL  High  0.67-1.17  

 

 BUN/Creatinine Ratio  27.4  High  8-20  

 

 Calcium  10.1 mg/dL  Normal  8.6-10.3  

 

 Total Protein  7.3 g/dL  Normal  6.4-8.9  

 

 Albumin  4.4 g/dL  Normal  3.2-5.2  

 

 Globulin  2.9 g/dL  Normal  2-4  

 

 Albumin/Globulin Ratio  1.5  Normal  1-3  

 

 Total Bilirubin  0.80 mg/dL  Normal  0.2-1.0  

 

 Alkaline Phosphatase  93 U/L  Normal    

 

 Alt  24 U/L  Normal  7-52  

 

 Ast  32 U/L  Normal  13-39  

 

 Egfr Non-  37.3    >60  

 

 Egfr   45.2    >60  3

 

 Potassium  6.0 mmol/L  High  3.5-5.0  

 

 Anion Gap  8 mmol/L  Normal  2-11  









 Laboratory test  2020  [facility]  Magnesium  1.8 mg/dL  Low  
1.9-2.7  



 finding    [address] [place] [address]          



     [phone]          









 Thyroxine  6.85 g/dL  Normal  6.09-12.23  

 

 TSH (Thyroid Stim Horm)  3.53 mcIU/mL  Normal  0.34-5.60  

 

 Free T4 (Free Thyroxine)  1.09 ng/dL  Normal  0.61-1.12  









 INR/Protime  2020  [facility]  INR  2.85  High  0.82-1.09  4



     [address] [place] [address]          



     [phone]          

 

 INR/Protime  2020  [facility]  INR  3.25  High  0.82-1.09  5



     [address]  [place] [address]          



     [phone]          

 

 INR/Protime  2019  [facility]  INR  2.04  High  0.82-1.09  6



     [address]  [place] [address]          



     [phone]          

 

 INR/Protime  2019  [facility]  INR  2.08  High  0.82-1.09  7



     [address]  [place] [address]          



     [phone]          

 

 INR/Protime  2019  [facility]  INR  1.82  High  0.82-1.09  8



     [address]  [place] [address]          



     [phone]          

 

 INR/Protime  2019  [facility]  INR  2.33  High  0.82-1.09  9



     [address]  [place] [address]          



     [phone]          

 

 INR/Protime  2019  [facility]  INR  2.54  High  0.82-1.09  10



     [address]          



     [place] [address]          



     [phone]          

 

 INR/Protime  10/31/2019  [facility]  INR  1.65  High  0.82-1.09  11



     [address]          



     [place] [address]          



     [phone]          

 

 INR/Protime  10/24/2019  [facility]  INR  1.90  High  0.82-1.09  12



     [address]          



     [place], [place] [id number] [phone]          

 

 Protime W/ INR  10/17/2019  [facility]  INR  1.84  High  0.82-1.09  
13



     [address]          



     [place] [address] [phone]          

 

 Protime W/ INR  10/03/2019  [facility]  INR  2.36  High  0.82-1.09  
14



     [address]          



     [place] [address]          



     [phone]          

 

 Coumadin Dx  2019  N2N/CCD Import  Coumadin Dx  Pe      

 

 INR  2019  N2N/CCD Import  INR  1.45 _    -  

 

 INR Goal  2019  N2N/CCD Import  INR Goal  2.0 to 3.0      

 

 Recheck INR  2019  N2N/CCD Import  Recheck INR  2019      

 

 INR  2019  N2N/CCD Import  INR  1.45 _  High  0.82-1.09  

 

 Coumadin Dx  08/15/2019  N2N/CCD Import  Coumadin Dx  Pe      

 

 INR  08/15/2019  N2N/CCD Import  INR  1.70 _    -  

 

 INR Goal  08/15/2019  N2N/CCD Import  INR Goal  2.0 to 3.0      

 

 Recheck INR  08/15/2019  N2N/CCD Import  Recheck INR  2 weeks      



         2019      

 

 INR  2019  N2N/CCD Import  INR  1.70 _  High  0.82-1.09  









 1  ORDERED 8..19  EXPIRES .20

 

 2  Standard intensity warfarin therapeutic range: 2.0-3.0



   High intensity warfarin therapeutic range: 2.5-3.5

 

 3  *******Because ethnic data is not always readily available,



   this report includes an eGFR for both -Americans and



   non- Americans.****



   The National Kidney Disease Education Program (NKDEP) does



   not endorse the use of the MDRD equation for patients that



   are not between the ages of 18 and 70, are pregnant, have



   extremes of body size, muscle mass, or nutritional status,



   or are non- or non-.



   According to the National Kidney Foundation, irrespective of



   diagnosis, the stage of the disease is based on the level of



   kidney function:



   Stage Description                      GFR(mL/min/1.73 m(2))



   1     Kidney damage with normal or decreased GFR       90



   2     Kidney damage with mild decrease in GFR          60-89



   3     Moderate decrease in GFR                         30-59



   4     Severe decrease in GFR                           15-29



   5     Kidney failure                       <15 (or dialysis)

 

 4  Standard intensity warfarin therapeutic range: 2.0-3.0



   High intensity warfarin therapeutic range: 2.5-3.5

 

 5  Standard intensity warfarin therapeutic range: 2.0-3.0



   High intensity warfarin therapeutic range: 2.5-3.5

 

 6  Standard intensity warfarin therapeutic range: 2.0-3.0



   High intensity warfarin therapeutic range: 2.5-3.5

 

 7  Standard intensity warfarin therapeutic range: 2.0-3.0



   High intensity warfarin therapeutic range: 2.5-3.5

 

 8  Standard intensity warfarin therapeutic range: 2.0-3.0



   High intensity warfarin therapeutic range: 2.5-3.5

 

 9  Standard intensity warfarin therapeutic range: 2.0-3.0



   High intensity warfarin therapeutic range: 2.5-3.5

 

 10  Standard intensity warfarin therapeutic range: 2.0-3.0



   High intensity warfarin therapeutic range: 2.5-3.5

 

 11  Standard intensity warfarin therapeutic range: 2.0-3.0



   High intensity warfarin therapeutic range: 2.5-3.5

 

 12  Standard intensity warfarin therapeutic range: 2.0-3.0



   High intensity warfarin therapeutic range: 2.5-3.5

 

 13  Standard intensity warfarin therapeutic range: 2.0-3.0



   High intensity warfarin therapeutic range: 2.5-3.5

 

 14  Standard intensity warfarin therapeutic range: 2.0-3.0



   High intensity warfarin therapeutic range: 2.5-3.5







Procedures







 Date  Code  Description  Status

 

 2020  47775  Anticoagulant MGMT For Patient Taking Warfarin, Inc Review  
Completed



     & Intr  

 

 2020  35903  Anticoagulant MGMT For Patient Taking Warfarin, Inc Review  
Completed



     & Intr  

 

 2020  22070  Anticoagulant MGMT For Patient Taking Warfarin, Inc Review  
Completed



     & Intr  

 

 2019  87918  Anticoagulant MGMT For Patient Taking Warfarin, Inc Review  
Completed



     & Intr  

 

 12/10/2019  06648  Anticoagulant MGMT For Patient Taking Warfarin, Inc Review  
Completed



     & Intr  

 

 2019  11460  Anticoagulant MGMT For Patient Taking Warfarin, Inc Review  
Completed



     & Intr  

 

 2019  45954  Anticoagulant MGMT For Patient Taking Warfarin, Inc Review  
Completed



     & Intr  

 

 2019  98184  Anticoagulant MGMT For Patient Taking Warfarin, Inc Review  
Completed



     & Intr  

 

 10/25/2019  27504  Anticoagulant MGMT For Patient Taking Warfarin, Inc Review  
Completed



     & Intr  

 

 2019  73911  Anticoagulant MGMT For Patient Taking Warfarin, Inc Review  
Completed



     & Intr  

 

 08/15/2019  70506  Anticoagulant MGMT For Patient Taking Warfarin, Inc Review  
Completed



     & Intr  







Medical Devices







 Description

 

 No Information Available







Encounters







 Type  Date  Location  Provider  Dx  Diagnosis

 

 Office Visit  2020  [facility]  Carin Castellanos MD  I10  Essential (primary
)



   3:00p        hypertension









 J30.89  Other allergic rhinitis







Assessments







 Date  Code  Description  Provider

 

 2020  H61.23  Impacted cerumen, bilateral  Carin Castellanos MD

 

 2020  I10  Essential (primary) hypertension  Carin Castellanos MD

 

 2020  J30.89  Other allergic rhinitis  Carin Castellanos MD

 

 2019  I48.19  Other persistent atrial fibrillation  Subhash Rod MD







Plan of Treatment

No Information Available



Functional Status







 Description

 

 No Information Available







Mental Status







 Description

 

 No Information Available







Referrals







 Description

 

 No Information Available

## 2020-03-11 NOTE — UC
Skin Complaint HPI





- HPI Summary


HPI Summary: 





Pt presents with c/o skin tear to right dorsal aspect of hand that occurred 

last evening after he hit his hand against a wall. Pt is on coumadin and states 

that he had a large "blood blister" that must have opened and drained over 

night.  Pt is aconcerned that he is developing cellulitis as he thinks skin 

around skin tears is "red'





- History of Current Complaint


Chief Complaint: UCSkin


Time Seen by Provider: 03/11/20 17:38


Stated Complaint: WOUND CHECK


Hx Obtained From: Patient


Onset/Duration: Sudden Onset, Still Present


Skin Exposure Onset/Duration: Hours Ago


Timing: Constant


Onset Severity: Moderate


Current Severity: Mild


Pain Intensity: 0


Location: Discrete - right hand dorsal aspect


Character: Swelling, Redness


Aggravating Factor(s): Nothing


Alleviating Factor(s): Nothing


Associated Signs & Symptoms: Positive: Bruising


Related History: Trauma - mild





- Allergy/Home Medications


Allergies/Adverse Reactions: 


 Allergies











Allergy/AdvReac Type Severity Reaction Status Date / Time


 


propafenone Allergy  Dizziness Verified 12/21/18 08:19











Home Medications: 


 Home Medications





Multivitamin [Multivitamins] 1 tab PO QPM 07/17/13 [History Confirmed 03/11/20]


Timolol 0.25% OPHTH.SOLN* [Timoptic Ophth.soln 0.25%*] 1 drop BOTH EYES BID 07/ 17/13 [History Confirmed 03/11/20]


Warfarin TAB(*) [Coumadin TAB(*)] 2 mg PO DAILY 06/20/14 [History Confirmed 03/ 11/20]


Furosemide TAB* [Lasix TAB*] 2 tab PO DAILY 09/01/15 [History Confirmed 03/11/20

]


Simvastatin 20 mg PO QAM 07/12/18 [History Confirmed 03/11/20]


Oxygen 2 % BEDTIME 02/23/19 [History Confirmed 03/11/20]


Pilocarpine 4% OPTH.SOL* 1 drop .SEE ORDER MONTHLY 02/23/19 [History Confirmed 

03/11/20]


lisinopriL [Lisinopril 2.5 MG-] 0.5 tab PO DAILY 03/11/20 [History Confirmed 03/ 11/20]











PMH/Surg Hx/FS Hx/Imm Hx


Previously Healthy: Yes





- Surgical History


Surgical History: Yes


Surgery Procedure, Year, and Place: 2013-Right MENISCUS REPAIR-2013 [place].  

2009- SURGERY BLADDER CANCER.  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  kidney stone 

removal.  PACEMAKER INSERTION





- Family History


Known Family History: Positive: Cardiac Disease, Hypertension





- Social History


Occupation: Retired


Lives: With Family


Alcohol Use: Daily


Alcohol Amount: cocktail x1 nightly


Substance Use Type: None


Smoking Status (MU): Former Smoker


Length of Time of Smoking/Using Tobacco: 1-2 PPD x 25 Years


Have You Smoked in the Last Year: No


When Did the Patient Quit Smoking/Using Tobacco: 1985





- Immunization History


Most Recent Tetanus Shot: UNKNOWN





Review of Systems


All Other Systems Reviewed And Are Negative: Yes


Constitutional: Positive: Negative


Skin: Positive: Bruising, Other - skin tears right hand dorsal aspect


Eyes: Positive: Negative


ENT: Positive: Negative


Respiratory: Positive: Negative


Cardiovascular: Positive: Negative


Gastrointestinal: Positive: Negative


Genitourinary: Positive: Negative


Motor: Positive: Negative


Neurovascular: Positive: Negative


Musculoskeletal: Positive: Edema - mild swelling


Neurological/Mental Status: Positive: Negative


Psychological: Positive: Negative


Is Patient Immunocompromised?: No





Physical Exam


Triage Information Reviewed: Yes


Appearance: Well-Appearing


Vital Signs: 


 Initial Vital Signs











Temp  96.8 F   03/11/20 17:30


 


Pulse  62   03/11/20 17:30


 


Resp  18   03/11/20 17:30


 


BP  145/67   03/11/20 17:30


 


Pulse Ox  95   03/11/20 17:30











Vital Signs Reviewed: Yes


Eye Exam: Normal


ENT Exam: Normal


Neck exam: Normal


Respiratory Exam: Normal


Respiratory: Positive: No respiratory distress


Musculoskeletal: Positive: Edema @ - mild swelling


Neurological Exam: Normal


Psychological Exam: Normal


Skin Exam: Other - bruising, skin tears with bleeding controlled, mild erythema 

and swelling.  no tenderness, hand temperature is warm but similar to left hand.





Course/Dx





- Differential Diagnoses - Skin Complaint


Differential Diagnoses: Cellulitis





- Diagnoses


Provider Diagnosis: 


 Skin tear of right hand without complication








Discharge ED





- Sign-Out/Discharge


Documenting (check all that apply): Patient Departure


All imaging exams completed and their final reports reviewed: No Studies





- Discharge Plan


Condition: Stable


Disposition: HOME


Patient Education Materials:  Skin Tear (ED)


Referrals: 


Subhash Rod MD [Primary Care Provider] - If Needed





- Billing Disposition and Condition


Condition: STABLE


Disposition: Home

## 2020-03-11 NOTE — XMS REPORT
Continuity of Care Document (CCD)

 Created on:2020



Patient:Jorge Castañeda

Sex:Male

:1935

External Reference #:[id number]





Demographics







 Address  [address] [place] [address]

 

 Mobile Phone  [phone]

 

 Email Address  [email or web address]

 

 Preferred Language  en

 

 Marital Status  Not  or 

 

 Islam Affiliation  Unknown

 

 Race  White

 

 Ethnic Group  Not  or 









Author







 Name  Nissa Beasley MD (transmitted by agent of provider Dina Mina)

 

 Address  [address], [address]



   [place] [address]









Care Team Providers







 Name  Role  Phone

 

 Brock Engle MD - Cardiovascular  Care Team Information   [phone]
[phone]



 Disease    

 

 Carin Castellanos M.D. - Family  Care Team Information   [phone]
[phone]



 Medicine    









Problems







 Active Problems  Provider  Date

 

 Atrial fibrillation  Taylor Gerber M.D.  Onset: 2015

 

 Essential hypertension  Taylor Gerber M.D.  Onset: 2015

 

 Tricuspid valve disorder, non-rheumatic  Taylor Gerber M.D.  Onset: 2015

 

 Chronic atrial fibrillation  Taylor Gerber M.D.  Onset: 2016

 

 Chronic obstructive lung disease  Nissa Beasley MD  Onset: 10/04/2016

 

 Cardiomyopathy  Taylor Gerber M.D.  Onset: 10/19/2017

 

 Mitral valve disorder  Taylor Gerber M.D.  Onset: 2017

 

 Pulmonary hypertension due to left heart disease  Taylor Gerber M.D.  Onset: 

 

 Premature beats  Taylor Gerber M.D.  Onset: 2018

 

 Paroxysmal atrial fibrillation  Taylor Gerber M.D.  Onset: 2018

 

 Cardiac pacemaker in situ  Taylor Gerber M.D.  Onset: 2018

 

 Pulmonary hypertension due to left heart disease  Taylor Gerber M.D.  Onset: 

 

 Chronic combined systolic and diastolic heart  Taylor Gerber M.D.  Onset: 



 failure    







Social History







 Type  Date  Description  Comments

 

 Birth Sex    Unknown  

 

 Tobacco Use  Start: Unknown End:  Former Cigarette Smoker  



   Unknown    

 

 Smoking Status  Reviewed: 20  Former Cigarette Smoker  

 

 ETOH Use    Vodka  4oz per day

 

 Recreational Drug Use    Denies Drug Use  

 

 Tobacco Use  Start: Unknown End:  Patient is a former  quit in 



   Unknown  smoker  

 

 Exercise Type/Frequency    Exercises regularly  5 days/week at gym







Allergies, Adverse Reactions, Alerts







 Active Allergies  Reaction  Severity  Comments  Date

 

 Rythmol  dizziness  Moderate    10/03/2012

 

 Metoprolol Succinate        2018







Medications







 Active Medications  SIG  Qnty  Indications  Ordering  Date



         Provider  

 

 Aldactone  1 by mouth every  90tabs  I49.3  Taylor Gerber,  2019



         25mg Tablets  day      M.D.  



           

 

 Furosemide  2 by mouth every  180tabs    Taylor Gerber,  2018



          20mg Tablets  day      M.D.  



           

 

 Pulse Oximeter  use as instructed  1units  R09.02  [facility],  2017



               [facility]        MD  



           

 

 Oxygen  please use o2 at  1units    [facility] [place],  2017



       Misc  1L/min during      MD  



   exertion        

 

 Multivitamins  1 tablet po daily      Unknown  

 

 Simvastatin  1 by mouth every  90tabs    Taylor Gerber,  



           20mg  day      M.D.  



 Tablets          



           

 

 Warfarin  1 tablet for 2      Unknown  



        2mg Tablets  days and 2.5        



   tablet for 5 days        



   as directed (        



   adjustment Dr. Curiel)        

 

 Timolol Maleate  1 drop per eye 3      Unknown  



               0.25%  times a day        



 Solution          



           

 

 Lisinopril  1/2 pill by mouth  45tabs    Taylor Gerber,  



          5mg Tablets  every day (20      M.DJudi  



   Hold)        

 

 Pilocarpine HCL  1 gtt in both eyes      Unknown  



               1%  1 x month        



 Solution          



           







Medications Administered in Office







 Medication  SIG  Qnty  Indications  Ordering Provider  Date

 

 Inj, Regadenoson, 0.1 MG        Jus Coles M.D.,  2015



                  Injection        SEJAL GALINDO  



           

 

 Inj, Regadenoson, 0.1 MG        Taylor Gerber M.D.  2015



                  Injection          



           

 

 Technetium TC 99M        Jus Coles M.D.,  2015



 Tetrofosmin, Per Unit Dose        SEJAL GALINDO  



 Up To 40 Millicuries          



              Injection          



           

 

 Technetium TC 99M        Taylor Gerber M.D.  2015



 Tetrofosmin, Per Unit Dose          



 Up To 40 Millicuries          



              Injection          



           







Immunizations







 CPT Code  Status  Date  Vaccine  Lot #

 

 [id number]  Given  10/04/2016  Influenza Virus Vaccine, Quadrivalent, Split,  
[id number]



       Preservative Free  







Vital Signs







 Date  Vital  Result  Comment

 

 2020  1:46pm  Height  67 inches  5'7"









 Weight  165.00 lb  

 

 Heart Rate  72 /min  

 

 BP Systolic  100 mmHg  

 

 BP Diastolic  60 mmHg  

 

 O2 % BldC Oximetry  98 %  

 

 BMI (Body Mass Index)  25.8 kg/m2  









 2019  2:57pm  Height  67 inches  5'7"









 Weight  167.00 lb  with shoes

 

 Heart Rate  58 /min  

 

 BP Systolic Sitting  100 mmHg  Lue reg cuff

 

 BP Diastolic Sitting  54 mmHg  Lue reg cuff

 

 BP Systolic Standing  110 mmHg  Lue reg cuff

 

 BP Diastolic Standing  60 mmHg  Lue reg cuff

 

 Respiratory Rate  16 /min  

 

 BMI (Body Mass Index)  26.2 kg/m2  

 

 Ejection Fraction  45-50%  date 3/26/19 ECHO







Results







 Test  Acquired Date  Facility  Test  Result  H/L  Range  Note

 

 Comp Metabolic  2020  [facility]  Sodium  139 mmol/L  Normal  
135-145  



 Panel    [address] [place], [place] [id number] [phone]          









 Chloride  99 mmol/L  Low  101-111  

 

 Co2 Carbon Dioxide  32 mmol/L  Normal  22-32  

 

 Glucose  73 mg/dL  Normal    

 

 Blood Urea Nitrogen  48 mg/dL  High  6-24  

 

 Creatinine  1.75 mg/dL  High  0.67-1.17  

 

 BUN/Creatinine Ratio  27.4  High  8-20  

 

 Calcium  10.1 mg/dL  Normal  8.6-10.3  

 

 Total Protein  7.3 g/dL  Normal  6.4-8.9  

 

 Albumin  4.4 g/dL  Normal  3.2-5.2  

 

 Globulin  2.9 g/dL  Normal  2-4  

 

 Albumin/Globulin Ratio  1.5  Normal  1-3  

 

 Total Bilirubin  0.80 mg/dL  Normal  0.2-1.0  

 

 Alkaline Phosphatase  93 U/L  Normal    

 

 Alt  24 U/L  Normal  7-52  

 

 Ast  32 U/L  Normal  13-39  

 

 Egfr Non-  37.3    >60  

 

 Egfr   45.2    >60  1

 

 Potassium  6.0 mmol/L  High  3.5-5.0  

 

 Anion Gap  8 mmol/L  Normal  2-11  









 Laboratory test  2020  [facility]  Magnesium  1.8 mg/dL  Low  
1.9-2.7  



 finding    [address]          



     [place] [address] [phone]          









 B-Type Natriuretic Peptide BNP  221 pg/mL  High  <=100  









 Thyroid  2020  [facility]  Free T4 (Free  1.09  Normal  0.61-
1.12  



 Panel    [address]  Thyroxine)  ng/dL      



     [place] [address] [phone]          









 Thyroxine  6.85 g/dL  Normal  6.09-12.23  

 

 TSH (Thyroid Stim Horm)  3.53 mcIU/mL  Normal  0.34-5.60  









 1  *******Because ethnic data is not always readily available,



   this report includes an eGFR for both -Americans and



   non- Americans.****



   The National Kidney Disease Education Program (NKDEP) does



   not endorse the use of the MDRD equation for patients that



   are not between the ages of 18 and 70, are pregnant, have



   extremes of body size, muscle mass, or nutritional status,



   or are non- or non-.



   According to the National Kidney Foundation, irrespective of



   diagnosis, the stage of the disease is based on the level of



   kidney function:



   Stage Description                      GFR(mL/min/1.73 m(2))



   1     Kidney damage with normal or decreased GFR       90



   2     Kidney damage with mild decrease in GFR          60-89



   3     Moderate decrease in GFR                         30-59



   4     Severe decrease in GFR                           15-29



   5     Kidney failure                       <15 (or dialysis)







Procedures







 Date  Code  Description  Status

 

 2020  01790  ECHO Transthoracic, Real-Time 2D With Doppler And Color  
Completed



     Flow  

 

 2019  34272  Icd Eval Sing,Dual,Multi Lead Remote Recpt Transm Tech Rev  
Completed



     Tech S  

 

 2019  63562  Icd Eval Sing,Dual,Multi Lead Remote Recpt Transm Tech Rev  
Completed



     Tech S  

 

 2019  92520  Pacemaker Check Remote Up To 90Days Single,Dual,Multiple  
Completed



     Lead  

 

 2019  06200  Pacemaker Check Remote Up To 90Days Single,Dual,Multiple  
Completed



     Lead  

 

 2019  19835  Icd Eval Sing,Dual,Multi Lead Remote Recpt Transm Tech Rev  
Completed



     Tech S  

 

 2019  23231  Icd Eval Sing,Dual,Multi Lead Remote Recpt Transm Tech Rev  
Completed



     Tech S  

 

 2019  40519  Pacemaker Check Remote Up To 90Days Single,Dual,Multiple  
Completed



     Lead  

 

 2019  95069  Pacemaker Check Remote Up To 90Days Single,Dual,Multiple  
Completed



     Lead  







Medical Devices







 Description

 

 No Information Available







Encounters







 Description

 

 No Information Available







Assessments







 Date  Code  Description  Provider

 

 2020  J44.9  Chronic obstructive pulmonary disease,  Nissa Beasley MD



     unspecified  

 

 2020  R09.02  Hypoxemia  Nissa Beasley MD

 

 2020  I42.9  Cardiomyopathy, unspecified  Traveling ECHO 2

 

 2020  I48.21  Permanent atrial fibrillation  Traveling ECHO 2

 

 2020  I49.3  Ventricular premature depolarization  Traveling ECHO 2

 

 2019  I48.21  Permanent atrial fibrillation  Taylor Gerber M.D.

 

 2019  I48.21  Permanent atrial fibrillation  Remote Device Checks

 

 2019  Z95.0  Presence of cardiac pacemaker  Taylor Gerber M.D.

 

 2019  Z95.0  Presence of cardiac pacemaker  Remote Device Checks

 

 2019  I48.2  Chronic atrial fibrillation  Taylor Gerber M.D.

 

 2019  I48.2  Chronic atrial fibrillation  Remote Device Checks

 

 2019  Z95.0  Presence of cardiac pacemaker  Taylor Gerber M.D.

 

 2019  Z95.0  Presence of cardiac pacemaker  Remote Device Checks







Plan of Treatment

Future Appointment(s):2020  2:00 pm - Jarad Singer MD at [facility] 
AT Pnzfiozx50/23/2020 - Nissa Beasley MDJ44.9 Chronic obstructive pulmonary 
disease, unspecifiedFollow up:1 yearR09.02 Hypoxemia



Functional Status







 Description

 

 No Information Available







Mental Status







 Description

 

 No Information Available







Referrals







 Description

 

 No Information Available

## 2020-03-11 NOTE — XMS REPORT
Continuity of Care Document (CCD)

 Created on:2020



Patient:Jorge Castañeda

Sex:Male

:1935

External Reference #:[id number]





Demographics







 Address  [address] [place] [address]

 

 Home Phone  [phone]

 

 Preferred Language  Unknown

 

 Marital Status  Unknown

 

 Denominational Affiliation  Unknown

 

 Race  Unknown

 

 Ethnic Group  Unknown









Author







 Name  Carin Castellanos MD (transmitted by agent of provider Brent Rod)

 

 Address  [address] [place] [address]









Problems







 Active Problems  Provider  Date

 

 Essential hypertension    Onset: 2019

 

 Multiple premature ventricular complexes    Onset: 2018

 

 Chronic obstructive lung disease    Onset: 2018

 

 Pulmonary hypertension    Onset: 2017

 

 Bilateral hearing loss    Onset: 10/24/2017

 

 History of malignant neoplasm of prostate    Onset: 10/23/2017

 

 Hyperlipidemia    Onset: 10/23/2017

 

 Kidney stone    Onset: 10/23/2017









 Inactive Problems









 Pulmonary embolism    Onset: 2019









 Inactive: 2019







Social History







 Type  Date  Description  Comments

 

 Birth Sex    Unknown  

 

 Tobacco Use  Start: Unknown End: Unknown  Patient is a former smoker  quit in 


 

 Smoking Status  Reviewed: 20  Patient is a former smoker  quit in 







Allergies, Adverse Reactions, Alerts







 Active Allergies  Reaction  Severity  Comments  Date

 

 Diltiazem  leg edema, malaise.  Moderate    2019

 

 Metoprolol  wheezing, exacerbated COPD  Moderate    2019

 

 Rhythmol  dizziness  Moderate    2019







Medications







 Active Medications  SIG  Qnty  Indications  Ordering  Date



         Provider  

 

 Simvastatin  1  daily Oral  90tabs    Unknown  2019



            20mg          



 Tablets          



           

 

 Warfarin Sodium  Oral; M/W/F 2  90tabs    Unknown  2019



                2.5mg  Pills        



 Tablets          



           

 

 Furosemide  2 by mouth M W F 1  30tabs    Unknown  10/23/2018



           20mg  by mouth T TH Sat        



 Tablets  Sun        



           

 

 Warfarin Sodium  Oral; Take 1  90tabs    Unknown  2018



                2mg  T/TH/Sat/Sun        



 Tablets          



           

 

 Pilocarpine HCL  1 drop 4 times a  15units    Unknown  10/23/2017



                1%  day Ophthalmic        



 Solution          



           

 

 Timolol Maleate  1   Ophthalmic;      Unknown  10/23/2017



 Ophthalmic Gel  both eyes every 12        



 Forming  hours.        



        0.25% GFS          



           







Immunizations







 CPT Code  Status  Date  Vaccine  Lot #

 

 [id number]  Given  2020  Flu High Dose  [id number]

 

 79569  Given  10/23/2018  Flu High Dose  

 

 [id number]  Given  10/23/2017  Flu High Dose  







Vital Signs







 Date  Vital  Result  Comment

 

 2020  4:04pm  BP Systolic  116 mmHg  









 BP Diastolic  68 mmHg  

 

 Height  65.5 inches  5'5.50"

 

 Weight  168.00 lb  

 

 Heart Rate  60 /min  

 

 Body Temperature  96.8 F  

 

 O2 % BldC Oximetry  99 %  

 

 BMI (Body Mass Index)  27.5 kg/m2  









 2020  3:06pm  BP Systolic  116 mmHg  









 BP Diastolic  66 mmHg  

 

 Heart Rate  69 /min  

 

 Body Temperature  98.2 F  

 

 O2 % BldC Oximetry  98 %  







Results







 Test  Acquired Date  Facility  Test  Result  H/L  Range  Note

 

 Comp Metabolic  2020  [facility]  Sodium  140 mmol/L  Normal  
135-145  



 Panel    [address]          



     [place] [address] [phone]          









 Potassium  4.4 mmol/L  Normal  3.5-5.0  

 

 Chloride  98 mmol/L  Low  101-111  

 

 Co2 Carbon Dioxide  35 mmol/L  High  22-32  

 

 Anion Gap  7 mmol/L  Normal  2-11  

 

 Glucose  103 mg/dL  High    

 

 Blood Urea Nitrogen  28 mg/dL  High  6-24  

 

 Creatinine  1.15 mg/dL  Normal  0.67-1.17  

 

 BUN/Creatinine Ratio  24.3  High  8-20  

 

 Calcium  9.8 mg/dL  Normal  8.6-10.3  

 

 Total Protein  6.9 g/dL  Normal  6.4-8.9  

 

 Albumin  4.2 g/dL  Normal  3.2-5.2  

 

 Globulin  2.7 g/dL  Normal  2-4  

 

 Albumin/Globulin Ratio  1.6  Normal  1-3  

 

 Total Bilirubin  0.90 mg/dL  Normal  0.2-1.0  

 

 Alkaline Phosphatase  98 U/L  Normal    

 

 Alt  21 U/L  Normal  7-52  

 

 Ast  30 U/L  Normal  13-39  

 

 Egfr Non-  60.6    >60  

 

 Egfr   73.3    >60  1









 INR/Protime  2020  [facility]  INR  2.83  High  0.82-1.09  2, 
3



     [address]          



     [place] [address] [phone]          

 

 CMP  01/15/2020  N2N/CCD Import  Albumin QN  3.8      



       Serum/Plasma        









 Alt - SGPT  33      

 

 Calcium Ser/Plasma Mass/Vol  9.3      

 

 Carbon Dioxide QN Ser/Plas  31      

 

 Chloride Serum/Plasma  102      

 

 Creatinine QN Serum MCNC  1.9      

 

 Glucose Serum  90      

 

 Alkaline Phosphatase QN Ser/PL  91      

 

 Potassium QN Ser/PL Mols/Vol  5.1      

 

 Protein Total QN Ser/Plas  7.8      

 

 Sodium QN Ser/Plasma  138      

 

 Ast - Sgot  23      

 

 BUN - Urea Nitrogen Ser/Plas  57      

 

 Bilirubin Total Mass/Vol  0.7      

 

 GFR   36      

 

 GFR   44      









 Laboratory test  2020  [facility]  B-Type  221 pg/mL  High  <=
100  



 finding    [address]  Natriuretic        



     [place] [address]  Peptide BNP        



     [phone]          

 

 Comp Metabolic  2020  [facility]  Sodium  139  Normal  135-
145  



 Panel    [address]    mmol/L      



     [place] [address]          



     [phone]          









 Chloride  99 mmol/L  Low  101-111  

 

 Co2 Carbon Dioxide  32 mmol/L  Normal  22-32  

 

 Glucose  73 mg/dL  Normal    

 

 Blood Urea Nitrogen  48 mg/dL  High  6-24  

 

 Creatinine  1.75 mg/dL  High  0.67-1.17  

 

 BUN/Creatinine Ratio  27.4  High  8-20  

 

 Calcium  10.1 mg/dL  Normal  8.6-10.3  

 

 Total Protein  7.3 g/dL  Normal  6.4-8.9  

 

 Albumin  4.4 g/dL  Normal  3.2-5.2  

 

 Globulin  2.9 g/dL  Normal  2-4  

 

 Albumin/Globulin Ratio  1.5  Normal  1-3  

 

 Total Bilirubin  0.80 mg/dL  Normal  0.2-1.0  

 

 Alkaline Phosphatase  93 U/L  Normal    

 

 Alt  24 U/L  Normal  7-52  

 

 Ast  32 U/L  Normal  13-39  

 

 Egfr Non-  37.3    >60  

 

 Egfr   45.2    >60  4

 

 Potassium  6.0 mmol/L  High  3.5-5.0  

 

 Anion Gap  8 mmol/L  Normal  2-11  









 Laboratory test  2020  [facility]  Magnesium  1.8 mg/dL  Low  
1.9-2.7  



 finding    [address]          



     [place] [address]          



     [phone]          









 Thyroxine  6.85 g/dL  Normal  6.09-12.23  

 

 TSH (Thyroid Stim Horm)  3.53 mcIU/mL  Normal  0.34-5.60  

 

 Free T4 (Free Thyroxine)  1.09 ng/dL  Normal  0.61-1.12  









 INR/Protime  2020  [facility]  INR  2.85  High  0.82-1.09  5



     [address]          



     [place] [address]          



     [phone]          

 

 INR/Protime  2020  [facility]  INR  3.25  High  0.82-1.09  6



     [address]          



     [place] [address]          



     [phone]          

 

 INR/Protime  2019  [facility]  INR  2.04  High  0.82-1.09  7



     [address]          



     [place] [address]          



     [phone]          

 

 INR/Protime  2019  [facility]  INR  2.08  High  0.82-1.09  8



     [address]          



     [place] [address]          



     [phone]          

 

 INR/Protime  2019  [facility]  INR  1.82  High  0.82-1.09  9



     [address]          



     [place] [address]          



     [phone]          

 

 INR/Protime  2019  [facility]  INR  2.33  High  0.82-1.09  10



     [address]          



     [place] [address]          



     [phone]          

 

 INR/Protime  2019  [facility]  INR  2.54  High  0.82-1.09  11



     [address]          



     [place] [address]          



     [phone]          

 

 INR/Protime  10/31/2019  [facility]  INR  1.65  High  0.82-1.09  12



     [address]          



     [place] [address]          



     [phone]          

 

 INR/Protime  10/24/2019  [facility]  INR  1.90  High  0.82-1.09  13



     [address]          



     [place] [address]          



     [phone]          

 

 Protime W/ INR  10/17/2019  [facility]  INR  1.84  High  0.82-1.09  
14



     [address]          



     [place] [address]          



     [phone]          

 

 Protime W/ INR  10/03/2019  [facility]  INR  2.36  High  0.82-1.09  
15



     [address]          



     [place] [address]          



     [phone]          

 

 Coumadin Dx  2019  N2N/CCD Import  Coumadin Dx  Pe      

 

 INR  2019  N2N/CCD Import  INR  1.45 _    -  

 

 INR Goal  2019  N2N/CCD Import  INR Goal  2.0 to 3.0      

 

 Recheck INR  2019  N2N/CCD Import  Recheck INR  2019      

 

 INR  2019  N2N/CCD Import  INR  1.45 _  High  0.82-1.09  

 

 Coumadin Dx  08/15/2019  N2N/CCD Import  Coumadin Dx  Pe      

 

 INR  08/15/2019  N2N/CCD Import  INR  1.70 _    -  

 

 INR Goal  08/15/2019  N2N/CCD Import  INR Goal  2.0 to 3.0      

 

 Recheck INR  08/15/2019  N2N/CCD Import  Recheck INR  2 weeks      



         2019      

 

 INR  2019  N2N/CCD Import  INR  1.70 _  High  0.82-1.09  









 1  *******Because ethnic data is not always readily available,



   this report includes an eGFR for both -Americans and



   non- Americans.****



   The National Kidney Disease Education Program (NKDEP) does



   not endorse the use of the MDRD equation for patients that



   are not between the ages of 18 and 70, are pregnant, have



   extremes of body size, muscle mass, or nutritional status,



   or are non- or non-.



   According to the National Kidney Foundation, irrespective of



   diagnosis, the stage of the disease is based on the level of



   kidney function:



   Stage Description                      GFR(mL/min/1.73 m(2))



   1     Kidney damage with normal or decreased GFR       90



   2     Kidney damage with mild decrease in GFR          60-89



   3     Moderate decrease in GFR                         30-59



   4     Severe decrease in GFR                           15-29



   5     Kidney failure                       <15 (or dialysis)

 

 2  ORDERED 8..19  EXPIRES .20

 

 3  Standard intensity warfarin therapeutic range: 2.0-3.0



   High intensity warfarin therapeutic range: 2.5-3.5

 

 4  *******Because ethnic data is not always readily available,



   this report includes an eGFR for both -Americans and



   non- Americans.****



   The National Kidney Disease Education Program (NKDEP) does



   not endorse the use of the MDRD equation for patients that



   are not between the ages of 18 and 70, are pregnant, have



   extremes of body size, muscle mass, or nutritional status,



   or are non- or non-.



   According to the National Kidney Foundation, irrespective of



   diagnosis, the stage of the disease is based on the level of



   kidney function:



   Stage Description                      GFR(mL/min/1.73 m(2))



   1     Kidney damage with normal or decreased GFR       90



   2     Kidney damage with mild decrease in GFR          60-89



   3     Moderate decrease in GFR                         30-59



   4     Severe decrease in GFR                           15-29



   5     Kidney failure                       <15 (or dialysis)

 

 5  Standard intensity warfarin therapeutic range: 2.0-3.0



   High intensity warfarin therapeutic range: 2.5-3.5

 

 6  Standard intensity warfarin therapeutic range: 2.0-3.0



   High intensity warfarin therapeutic range: 2.5-3.5

 

 7  Standard intensity warfarin therapeutic range: 2.0-3.0



   High intensity warfarin therapeutic range: 2.5-3.5

 

 8  Standard intensity warfarin therapeutic range: 2.0-3.0



   High intensity warfarin therapeutic range: 2.5-3.5

 

 9  Standard intensity warfarin therapeutic range: 2.0-3.0



   High intensity warfarin therapeutic range: 2.5-3.5

 

 10  Standard intensity warfarin therapeutic range: 2.0-3.0



   High intensity warfarin therapeutic range: 2.5-3.5

 

 11  Standard intensity warfarin therapeutic range: 2.0-3.0



   High intensity warfarin therapeutic range: 2.5-3.5

 

 12  Standard intensity warfarin therapeutic range: 2.0-3.0



   High intensity warfarin therapeutic range: 2.5-3.5

 

 13  Standard intensity warfarin therapeutic range: 2.0-3.0



   High intensity warfarin therapeutic range: 2.5-3.5

 

 14  Standard intensity warfarin therapeutic range: 2.0-3.0



   High intensity warfarin therapeutic range: 2.5-3.5

 

 15  Standard intensity warfarin therapeutic range: 2.0-3.0



   High intensity warfarin therapeutic range: 2.5-3.5







Procedures







 Date  Code  Description  Status

 

 2020  64841  Anticoagulant MGMT For Patient Taking Warfarin, Inc Review  
Completed



     & Intr  

 

 2020  87971  Anticoagulant MGMT For Patient Taking Warfarin, Inc Review  
Completed



     & Intr  

 

 2020  91213  Anticoagulant MGMT For Patient Taking Warfarin, Inc Review  
Completed



     & Intr  

 

 2019  69647  Anticoagulant MGMT For Patient Taking Warfarin, Inc Review  
Completed



     & Intr  

 

 12/10/2019  09171  Anticoagulant MGMT For Patient Taking Warfarin, Inc Review  
Completed



     & Intr  

 

 2019  89662  Anticoagulant MGMT For Patient Taking Warfarin, Inc Review  
Completed



     & Intr  

 

 2019  08042  Anticoagulant MGMT For Patient Taking Warfarin, Inc Review  
Completed



     & Intr  

 

 2019  25476  Anticoagulant MGMT For Patient Taking Warfarin, Inc Review  
Completed



     & Intr  

 

 10/25/2019  64076  Anticoagulant MGMT For Patient Taking Warfarin, Inc Review  
Completed



     & Intr  

 

 2019  57058  Anticoagulant MGMT For Patient Taking Warfarin, Inc Review  
Completed



     & Intr  

 

 08/15/2019  90484  Anticoagulant MGMT For Patient Taking Warfarin, Inc Review  
Completed



     & Intr  







Medical Devices







 Description

 

 No Information Available







Encounters







 Type  Date  Location  Provider  Dx  Diagnosis

 

 Office Visit  2020  [facility] Darron Catsellanos MD  H61.23  Impacted 
cerumen,



   3:45p        bilateral

 

 Office Visit  2020  [facility] Darron Castellanos MD  I10  Essential (primary
)



   3:00p        hypertension









 J30.89  Other allergic rhinitis







Assessments







 Date  Code  Description  Provider

 

 2020  H61.23  Impacted cerumen, bilateral  Carin Castellanos MD

 

 2020  I10  Essential (primary) hypertension  Carin Castellanos MD

 

 2020  J30.89  Other allergic rhinitis  Carin Castellanos MD

 

 2019  I48.19  Other persistent atrial fibrillation  Subhash Rod MD







Plan of Treatment

Future Appointment(s):2020  2:15 pm - Nurse at [facility]2020 - Carin Castellanos MDH61.23 Impacted cerumen, bilateral



Functional Status







 Description

 

 No Information Available







Mental Status







 Description

 

 No Information Available







Referrals







 Description

 

 No Information Available

## 2020-03-11 NOTE — XMS REPORT
Continuity of Care Document (CCD)

 Created on:2020



Patient:Jorge Castañeda

Sex:Male

:1935

External Reference #:[id number]





Demographics







 Address  [address] [place] [address]

 

 Home Phone  [phone]

 

 Preferred Language  Unknown

 

 Marital Status  Unknown

 

 Latter day Affiliation  Unknown

 

 Race  Unknown

 

 Ethnic Group  Unknown









Author







 Name  Carin Castellanos MD

 

 Address  [address] [place] [address]









Problems







 Active Problems  Provider  Date

 

 Essential hypertension    Onset: 2019

 

 Multiple premature ventricular complexes    Onset: 2018

 

 Chronic obstructive lung disease    Onset: 2018

 

 Pulmonary hypertension    Onset: 2017

 

 Bilateral hearing loss    Onset: 10/24/2017

 

 History of malignant neoplasm of prostate    Onset: 10/23/2017

 

 Atrial fibrillation    Onset: 2018

 

 Hyperlipidemia    Onset: 10/23/2017

 

 Kidney stone    Onset: 10/23/2017

 

 Cardiac pacemaker in situ  Subhash Rod MD  Onset: 02/10/2020







Social History







 Type  Date  Description  Comments

 

 Birth Sex    Unknown  

 

 Tobacco Use  Start: Unknown End: Unknown  Patient is a former smoker  quit in 


 

 Smoking Status  Reviewed: 20  Patient is a former smoker  quit in 







Allergies, Adverse Reactions, Alerts







 Active Allergies  Reaction  Severity  Comments  Date

 

 Diltiazem  leg edema, malaise.  Moderate    2019

 

 Metoprolol  wheezing, exacerbated COPD  Moderate    2019

 

 Rhythmol  dizziness  Moderate    2019







Medications







 Active Medications  SIG  Qnty  Indications  Ordering  Date



         Provider  

 

 Simvastatin  1  daily Oral  90tabs    Unknown  2019



            20mg          



 Tablets          



           

 

 Warfarin Sodium  Oral; M/W/F 2  90tabs    Unknown  2019



                2.5mg  Pills        



 Tablets          



           

 

 Furosemide  2 by mouth M W F 1  30tabs    Unknown  10/23/2018



           20mg  by mouth T TH Sat        



 Tablets  Sun        



           

 

 Warfarin Sodium  Oral; Take 1  90tabs    Unknown  2018



                2mg  T/TH/Sat/Sun        



 Tablets          



           

 

 Pilocarpine HCL  1 drop 4 times a  15units    Unknown  10/23/2017



                1%  day Ophthalmic        



 Solution          



           

 

 Timolol Maleate  1   Ophthalmic;      Unknown  10/23/2017



 Ophthalmic Gel  both eyes every 12        



 Forming  hours.        



        0.25% GFS          



           







Immunizations







 CPT Code  Status  Date  Vaccine  Lot #

 

 [id number]  Given  2020  Flu High Dose  [id number]

 

 14295  Given  10/23/2018  Flu High Dose  

 

 02936  Given  10/23/2017  Flu High Dose  







Vital Signs







 Date  Vital  Result  Comment

 

 2020  4:04pm  BP Systolic  116 mmHg  









 BP Diastolic  68 mmHg  

 

 Height  65.5 inches  5'5.50"

 

 Weight  168.00 lb  

 

 Heart Rate  60 /min  

 

 Body Temperature  96.8 F  

 

 O2 % BldC Oximetry  99 %  

 

 BMI (Body Mass Index)  27.5 kg/m2  









 2020  3:06pm  BP Systolic  116 mmHg  









 BP Diastolic  66 mmHg  

 

 Heart Rate  69 /min  

 

 Body Temperature  98.2 F  

 

 O2 % BldC Oximetry  98 %  







Results







 Test  Acquired Date  Facility  Test  Result  H/L  Range  Note

 

 Comp Metabolic  2020  [facility]  Sodium  143 mmol/L  Normal  
135-145  



 Panel    [address] [place], [place] [id number] [phone]          









 Potassium  4.1 mmol/L  Normal  3.5-5.0  

 

 Chloride  102 mmol/L  Normal  101-111  

 

 Co2 Carbon Dioxide  35 mmol/L  High  22-32  

 

 Anion Gap  6 mmol/L  Normal  2-11  

 

 Glucose  92 mg/dL  Normal    

 

 Blood Urea Nitrogen  25 mg/dL  High  6-24  

 

 Creatinine  1.04 mg/dL  Normal  0.67-1.17  

 

 BUN/Creatinine Ratio  24.0  High  8-20  

 

 Calcium  9.9 mg/dL  Normal  8.6-10.3  

 

 Total Protein  7.0 g/dL  Normal  6.4-8.9  

 

 Albumin  4.3 g/dL  Normal  3.2-5.2  

 

 Globulin  2.7 g/dL  Normal  2-4  

 

 Albumin/Globulin Ratio  1.6  Normal  1-3  

 

 Total Bilirubin  0.90 mg/dL  Normal  0.2-1.0  

 

 Alkaline Phosphatase  106 U/L  High    

 

 Alt  20 U/L  Normal  7-52  

 

 Ast  28 U/L  Normal  13-39  

 

 Egfr Non-  68.0    >60  

 

 Egfr   82.3    >60  1









 Comp Metabolic  2020  [facility]  Sodium  143 mmol/L  Normal  
135-145  



 Panel    [address] [place] [address] [phone]          









 Potassium  4.1 mmol/L  Normal  3.5-5.0  

 

 Chloride  103 mmol/L  Normal  101-111  

 

 Co2 Carbon Dioxide  34 mmol/L  High  22-32  

 

 Anion Gap  6 mmol/L  Normal  2-11  

 

 Glucose  136 mg/dL  High    

 

 Blood Urea Nitrogen  24 mg/dL  Normal  6-24  

 

 Creatinine  1.12 mg/dL  Normal  0.67-1.17  

 

 BUN/Creatinine Ratio  21.4  High  8-20  

 

 Calcium  9.6 mg/dL  Normal  8.6-10.3  

 

 Total Protein  7.0 g/dL  Normal  6.4-8.9  

 

 Albumin  4.2 g/dL  Normal  3.2-5.2  

 

 Globulin  2.8 g/dL  Normal  2-4  

 

 Albumin/Globulin Ratio  1.5  Normal  1-3  

 

 Total Bilirubin  0.90 mg/dL  Normal  0.2-1.0  

 

 Alkaline Phosphatase  107 U/L  High    

 

 Alt  23 U/L  Normal  7-52  

 

 Ast  31 U/L  Normal  13-39  

 

 Egfr Non-  62.5    >60  

 

 Egfr   75.6    >60  2









 INR/Protime  2020  [facility]  INR  1.78  High  0.82-1.09  3, 
4



     [address]          



     [place], [place] [address] [phone]          

 

 Laboratory test  2020  [facility]  CF INR  2.5      



 finding    (   )-   -          

 

 Comp Metabolic  2020  [facility]  Sodium  140  Normal  135-
145  



 Panel    [address]    mmol/L      



     [place], [place] [id number] [phone]          









 Potassium  4.4 mmol/L  Normal  3.5-5.0  

 

 Chloride  98 mmol/L  Low  101-111  

 

 Co2 Carbon Dioxide  35 mmol/L  High  22-32  

 

 Anion Gap  7 mmol/L  Normal  2-11  

 

 Glucose  103 mg/dL  High    

 

 Blood Urea Nitrogen  28 mg/dL  High  6-24  

 

 Creatinine  1.15 mg/dL  Normal  0.67-1.17  

 

 BUN/Creatinine Ratio  24.3  High  8-20  

 

 Calcium  9.8 mg/dL  Normal  8.6-10.3  

 

 Total Protein  6.9 g/dL  Normal  6.4-8.9  

 

 Albumin  4.2 g/dL  Normal  3.2-5.2  

 

 Globulin  2.7 g/dL  Normal  2-4  

 

 Albumin/Globulin Ratio  1.6  Normal  1-3  

 

 Total Bilirubin  0.90 mg/dL  Normal  0.2-1.0  

 

 Alkaline Phosphatase  98 U/L  Normal    

 

 Alt  21 U/L  Normal  7-52  

 

 Ast  30 U/L  Normal  13-39  

 

 Egfr Non-  60.6    >60  

 

 Egfr   73.3    >60  5









 INR/Protime  2020  [facility]  INR  2.83  High  0.82-1.09  6



     [address] [place] [address] [phone]          

 

 CMP  01/15/2020  N2N/CCD Import  Albumin QN  3.8      



       Serum/Plasma        









 Alt - SGPT  33      

 

 Calcium Ser/Plasma Mass/Vol  9.3      

 

 Carbon Dioxide QN Ser/Plas  31      

 

 Chloride Serum/Plasma  102      

 

 Creatinine QN Serum MCNC  1.9      

 

 Glucose Serum  90      

 

 Alkaline Phosphatase QN Ser/PL  91      

 

 Potassium QN Ser/PL Mols/Vol  5.1      

 

 Protein Total QN Ser/Plas  7.8      

 

 Sodium QN Ser/Plasma  138      

 

 Ast - Sgot  23      

 

 BUN - Urea Nitrogen Ser/Plas  57      

 

 Bilirubin Total Mass/Vol  0.7      

 

 GFR   36      

 

 GFR   44      









 INR/Protime  2020  [facility]  INR  2.85  High  0.82-1.09  7



     [address] [place] [address] [phone]          

 

 Laboratory test  2020  [facility]  Magnesium  1.8 mg/dL  Low  
1.9-2.7  



 finding    [address] [place] [address] [phone]          









 Thyroxine  6.85 g/dL  Normal  6.09-12.23  

 

 TSH (Thyroid Stim Horm)  3.53 mcIU/mL  Normal  0.34-5.60  

 

 Free T4 (Free Thyroxine)  1.09 ng/dL  Normal  0.61-1.12  









 Comp Metabolic  2020  [facility]  Sodium  139 mmol/L  Normal  
135-145  



 Panel    [address] [place] [address] [phone]          









 Chloride  99 mmol/L  Low  101-111  

 

 Co2 Carbon Dioxide  32 mmol/L  Normal  22-32  

 

 Glucose  73 mg/dL  Normal    

 

 Blood Urea Nitrogen  48 mg/dL  High  6-24  

 

 Creatinine  1.75 mg/dL  High  0.67-1.17  

 

 BUN/Creatinine Ratio  27.4  High  8-20  

 

 Calcium  10.1 mg/dL  Normal  8.6-10.3  

 

 Total Protein  7.3 g/dL  Normal  6.4-8.9  

 

 Albumin  4.4 g/dL  Normal  3.2-5.2  

 

 Globulin  2.9 g/dL  Normal  2-4  

 

 Albumin/Globulin Ratio  1.5  Normal  1-3  

 

 Total Bilirubin  0.80 mg/dL  Normal  0.2-1.0  

 

 Alkaline Phosphatase  93 U/L  Normal    

 

 Alt  24 U/L  Normal  7-52  

 

 Ast  32 U/L  Normal  13-39  

 

 Egfr Non-  37.3    >60  

 

 Egfr   45.2    >60  8

 

 Potassium  6.0 mmol/L  High  3.5-5.0  

 

 Anion Gap  8 mmol/L  Normal  2-11  









 Laboratory test  2020  [facility]  B-Type  221 pg/mL  High  <=
100  



 finding    [address]  Natriuretic        



     [place] [address]  Peptide BNP        



     [phone]          

 

 INR/Protime  2020  [facility]  INR  3.25  High  0.82-1.09  9



     [address]          



     [place] [address]          



     [phone]          

 

 INR/Protime  2019  [facility]  INR  2.04  High  0.82-1.09  10



     [address]          



     [place] [address]          



     [phone]          

 

 INR/Protime  2019  [facility]  INR  2.08  High  0.82-1.09  11



     [address]          



     [place] [address]          



     [phone]          

 

 INR/Protime  2019  [facility]  INR  1.82  High  0.82-1.09  12



     [address]          



     [place] [address]          



     [phone]          

 

 INR/Protime  2019  [facility]  INR  2.33  High  0.82-1.09  13



     [address]          



     [place] [address]          



     [phone]          

 

 INR/Protime  2019  [facility]  INR  2.54  High  0.82-1.09  14



     [address]          



     [place] [address]          



     [phone]          

 

 INR/Protime  10/31/2019  [facility]  INR  1.65  High  0.82-1.09  15



     [address]          



     [place] [address]          



     [phone]          

 

 INR/Protime  10/24/2019  [facility]  INR  1.90  High  0.82-1.09  16



     [address]          



     [place] [address]          



     [phone]          

 

 Protime W/ INR  10/17/2019  [facility]  INR  1.84  High  0.82-1.09  
17



     [address]          



     [place] [address]          



     [phone]          

 

 Protime W/ INR  10/03/2019  [facility]  INR  2.36  High  0.82-1.09  
18



     [address]          



     [place] [address]          



     [phone]          









 1  *******Because ethnic data is not always readily available,



   this report includes an eGFR for both -Americans and



   non- Americans.****



   The National Kidney Disease Education Program (NKDEP) does



   not endorse the use of the MDRD equation for patients that



   are not between the ages of 18 and 70, are pregnant, have



   extremes of body size, muscle mass, or nutritional status,



   or are non- or non-.



   According to the National Kidney Foundation, irrespective of



   diagnosis, the stage of the disease is based on the level of



   kidney function:



   Stage Description                      GFR(mL/min/1.73 m(2))



   1     Kidney damage with normal or decreased GFR       90



   2     Kidney damage with mild decrease in GFR          60-89



   3     Moderate decrease in GFR                         30-59



   4     Severe decrease in GFR                           15-29



   5     Kidney failure                       <15 (or dialysis)

 

 2  *******Because ethnic data is not always readily available,



   this report includes an eGFR for both -Americans and



   non- Americans.****



   The National Kidney Disease Education Program (NKDEP) does



   not endorse the use of the MDRD equation for patients that



   are not between the ages of 18 and 70, are pregnant, have



   extremes of body size, muscle mass, or nutritional status,



   or are non- or non-.



   According to the National Kidney Foundation, irrespective of



   diagnosis, the stage of the disease is based on the level of



   kidney function:



   Stage Description                      GFR(mL/min/1.73 m(2))



   1     Kidney damage with normal or decreased GFR       90



   2     Kidney damage with mild decrease in GFR          60-89



   3     Moderate decrease in GFR                         30-59



   4     Severe decrease in GFR                           15-29



   5     Kidney failure                       <15 (or dialysis)

 

 3  ORDERED .19  EXPIRES 20

 

 4  Standard intensity warfarin therapeutic range: 2.0-3.0



   High intensity warfarin therapeutic range: 2.5-3.5

 

 5  *******Because ethnic data is not always readily available,



   this report includes an eGFR for both -Americans and



   non- Americans.****



   The National Kidney Disease Education Program (NKDEP) does



   not endorse the use of the MDRD equation for patients that



   are not between the ages of 18 and 70, are pregnant, have



   extremes of body size, muscle mass, or nutritional status,



   or are non- or non-.



   According to the National Kidney Foundation, irrespective of



   diagnosis, the stage of the disease is based on the level of



   kidney function:



   Stage Description                      GFR(mL/min/1.73 m(2))



   1     Kidney damage with normal or decreased GFR       90



   2     Kidney damage with mild decrease in GFR          60-89



   3     Moderate decrease in GFR                         30-59



   4     Severe decrease in GFR                           15-29



   5     Kidney failure                       <15 (or dialysis)

 

 6  Standard intensity warfarin therapeutic range: 2.0-3.0



   High intensity warfarin therapeutic range: 2.5-3.5

 

 7  Standard intensity warfarin therapeutic range: 2.0-3.0



   High intensity warfarin therapeutic range: 2.5-3.5

 

 8  *******Because ethnic data is not always readily available,



   this report includes an eGFR for both -Americans and



   non- Americans.****



   The National Kidney Disease Education Program (NKDEP) does



   not endorse the use of the MDRD equation for patients that



   are not between the ages of 18 and 70, are pregnant, have



   extremes of body size, muscle mass, or nutritional status,



   or are non- or non-.



   According to the National Kidney Foundation, irrespective of



   diagnosis, the stage of the disease is based on the level of



   kidney function:



   Stage Description                      GFR(mL/min/1.73 m(2))



   1     Kidney damage with normal or decreased GFR       90



   2     Kidney damage with mild decrease in GFR          60-89



   3     Moderate decrease in GFR                         30-59



   4     Severe decrease in GFR                           15-29



   5     Kidney failure                       <15 (or dialysis)

 

 9  Standard intensity warfarin therapeutic range: 2.0-3.0



   High intensity warfarin therapeutic range: 2.5-3.5

 

 10  Standard intensity warfarin therapeutic range: 2.0-3.0



   High intensity warfarin therapeutic range: 2.5-3.5

 

 11  Standard intensity warfarin therapeutic range: 2.0-3.0



   High intensity warfarin therapeutic range: 2.5-3.5

 

 12  Standard intensity warfarin therapeutic range: 2.0-3.0



   High intensity warfarin therapeutic range: 2.5-3.5

 

 13  Standard intensity warfarin therapeutic range: 2.0-3.0



   High intensity warfarin therapeutic range: 2.5-3.5

 

 14  Standard intensity warfarin therapeutic range: 2.0-3.0



   High intensity warfarin therapeutic range: 2.5-3.5

 

 15  Standard intensity warfarin therapeutic range: 2.0-3.0



   High intensity warfarin therapeutic range: 2.5-3.5

 

 16  Standard intensity warfarin therapeutic range: 2.0-3.0



   High intensity warfarin therapeutic range: 2.5-3.5

 

 17  Standard intensity warfarin therapeutic range: 2.0-3.0



   High intensity warfarin therapeutic range: 2.5-3.5

 

 18  Standard intensity warfarin therapeutic range: 2.0-3.0



   High intensity warfarin therapeutic range: 2.5-3.5







Procedures







 Date  Code  Description  Status

 

 2020  33929  Anticoagulant MGMT For Patient Taking Warfarin, Inc Review  
Completed



     & Intr  

 

 2020  88934  Remove Impact Cerumen Irrigati  Completed

 

 2020  13188  Anticoagulant MGMT For Patient Taking Warfarin, Inc Review  
Completed



     & Intr  

 

 2020  50718  Remove Impact Cerumen Irrigati  Completed

 

 2020  97181  Remove Impact Cerumen Irrigati  Completed

 

 2020  44704  Anticoagulant MGMT For Patient Taking Warfarin, Inc Review  
Completed



     & Intr  

 

 2020  51442  Remove Impact Cerumen Irrigati  Completed

 

 2020  72037  Anticoagulant MGMT For Patient Taking Warfarin, Inc Review  
Completed



     & Intr  

 

 2020  65650  Anticoagulant MGMT For Patient Taking Warfarin, Inc Review  
Completed



     & Intr  

 

 2020  01414  Anticoagulant MGMT For Patient Taking Warfarin, Inc Review  
Completed



     & Intr  

 

 2019  35739  Anticoagulant MGMT For Patient Taking Warfarin, Inc Review  
Completed



     & Intr  

 

 12/10/2019  95079  Anticoagulant MGMT For Patient Taking Warfarin, Inc Review  
Completed



     & Intr  

 

 2019  06451  Anticoagulant MGMT For Patient Taking Warfarin, Inc Review  
Completed



     & Intr  

 

 2019  08162  Anticoagulant MGMT For Patient Taking Warfarin, Inc Review  
Completed



     & Intr  

 

 2019  46933  Anticoagulant MGMT For Patient Taking Warfarin, Inc Review  
Completed



     & Intr  

 

 10/25/2019  84275  Anticoagulant MGMT For Patient Taking Warfarin, Inc Review  
Completed



     & Intr  







Medical Devices







 Description

 

 No Information Available







Encounters







 Type  Date  Location  Provider  Dx  Diagnosis

 

 Office Visit  2020  [facility] Darron Castellanos MD  H61.23  Impacted 
cerumen,



   3:45p        bilateral

 

 Office Visit  2020  [facility]  Carin Castellanos MD  I10  Essential (primary
)



   3:00p        hypertension









 J30.89  Other allergic rhinitis

 

 Z23  Encounter for immunization







Assessments







 Date  Code  Description  Provider

 

 2020  I48.19  Other persistent atrial fibrillation  Nurse

 

 2020  H61.23  Impacted cerumen, bilateral  Nurse

 

 2020  H61.23  Impacted cerumen, bilateral  Carin Castellanos MD

 

 2020  I10  Essential (primary) hypertension  Carin Castellanos MD

 

 2020  J30.89  Other allergic rhinitis  Carin Castellanos MD

 

 2020  Z23  Encounter for immunization  Carin Castellanos MD

 

 2019  I48.19  Other persistent atrial fibrillation  Subhash Rod MD







Plan of Treatment

No Information Available



Functional Status







 Description

 

 No Information Available







Mental Status







 Description

 

 No Information Available







Referrals







 Description

 

 No Information Available

## 2020-03-11 NOTE — XMS REPORT
Continuity of Care Document (CCD)

 Created on:2020



Patient:Jorge Castañeda

Sex:Male

:1935

External Reference #:[id number]





Demographics







 Address  [address] [place] [address]

 

 Home Phone  [phone]

 

 Preferred Language  Unknown

 

 Marital Status  Unknown

 

 Hindu Affiliation  Unknown

 

 Race  Unknown

 

 Ethnic Group  Unknown









Author







 Name  Subhash Rod MD

 

 Address  [address] [place] [address]









Problems







 Active Problems  Provider  Date

 

 Essential hypertension    Onset: 2019

 

 Multiple premature ventricular complexes    Onset: 2018

 

 Chronic obstructive lung disease    Onset: 2018

 

 Pulmonary hypertension    Onset: 2017

 

 Bilateral hearing loss    Onset: 10/24/2017

 

 History of malignant neoplasm of prostate    Onset: 10/23/2017

 

 Atrial fibrillation    Onset: 2018

 

 Hyperlipidemia    Onset: 10/23/2017

 

 Kidney stone    Onset: 10/23/2017

 

 Cardiac pacemaker in situ  Subhash oRd MD  Onset: 02/10/2020







Social History







 Type  Date  Description  Comments

 

 Birth Sex    Unknown  

 

 Tobacco Use  Start: Unknown End: Unknown  Patient is a former smoker  quit in 


 

 Smoking Status  Reviewed: 20  Patient is a former smoker  quit in 







Allergies, Adverse Reactions, Alerts







 Active Allergies  Reaction  Severity  Comments  Date

 

 Diltiazem  leg edema, malaise.  Moderate    2019

 

 Metoprolol  wheezing, exacerbated COPD  Moderate    2019

 

 Rhythmol  dizziness  Moderate    2019







Medications







 Active Medications  SIG  Qnty  Indications  Ordering  Date



         Provider  

 

 Simvastatin  1  daily Oral  90tabs    Unknown  2019



            20mg          



 Tablets          



           

 

 Warfarin Sodium  Oral; M/W/F 2  90tabs    Unknown  2019



                2.5mg  Pills        



 Tablets          



           

 

 Furosemide  2 by mouth M W F 1  30tabs    Unknown  10/23/2018



           20mg  by mouth T TH Sat        



 Tablets  Sun        



           

 

 Warfarin Sodium  Oral; Take 1  90tabs    Unknown  2018



                2mg  T/TH/Sat/Sun        



 Tablets          



           

 

 Pilocarpine HCL  1 drop 4 times a  15units    Unknown  10/23/2017



                1%  day Ophthalmic        



 Solution          



           

 

 Timolol Maleate  1   Ophthalmic;      Unknown  10/23/2017



 Ophthalmic Gel  both eyes every 12        



 Forming  hours.        



        0.25% GFS          



           







Immunizations







 CPT Code  Status  Date  Vaccine  Lot #

 

 [id number]  Given  2020  Flu High Dose  [id number]

 

 52316  Given  10/23/2018  Flu High Dose  

 

 63569  Given  10/23/2017  Flu High Dose  







Vital Signs







 Date  Vital  Result  Comment

 

 2020  4:04pm  BP Systolic  116 mmHg  









 BP Diastolic  68 mmHg  

 

 Height  65.5 inches  5'5.50"

 

 Weight  168.00 lb  

 

 Heart Rate  60 /min  

 

 Body Temperature  96.8 F  

 

 O2 % BldC Oximetry  99 %  

 

 BMI (Body Mass Index)  27.5 kg/m2  









 2020  3:06pm  BP Systolic  116 mmHg  









 BP Diastolic  66 mmHg  

 

 Heart Rate  69 /min  

 

 Body Temperature  98.2 F  

 

 O2 % BldC Oximetry  98 %  







Results







 Test  Acquired Date  Facility  Test  Result  H/L  Range  Note

 

 INR/Protime  03/10/2020  [facility]  INR  2.38  High  0.82-1.09  1, 
2



     [address]          



     [place] [address] [phone]          

 

 Comp Metabolic  2020  [facility]  Sodium  143 mmol/L  Normal  
135-145  



 Panel    [address]          



     [place] [address] [phone]          









 Potassium  4.1 mmol/L  Normal  3.5-5.0  

 

 Chloride  102 mmol/L  Normal  101-111  

 

 Co2 Carbon Dioxide  35 mmol/L  High  22-32  

 

 Anion Gap  6 mmol/L  Normal  2-11  

 

 Glucose  92 mg/dL  Normal    

 

 Blood Urea Nitrogen  25 mg/dL  High  6-24  

 

 Creatinine  1.04 mg/dL  Normal  0.67-1.17  

 

 BUN/Creatinine Ratio  24.0  High  8-20  

 

 Calcium  9.9 mg/dL  Normal  8.6-10.3  

 

 Total Protein  7.0 g/dL  Normal  6.4-8.9  

 

 Albumin  4.3 g/dL  Normal  3.2-5.2  

 

 Globulin  2.7 g/dL  Normal  2-4  

 

 Albumin/Globulin Ratio  1.6  Normal  1-3  

 

 Total Bilirubin  0.90 mg/dL  Normal  0.2-1.0  

 

 Alkaline Phosphatase  106 U/L  High    

 

 Alt  20 U/L  Normal  7-52  

 

 Ast  28 U/L  Normal  13-39  

 

 Egfr Non-  68.0    >60  

 

 Egfr   82.3    >60  3









 Comp Metabolic  2020  [facility]  Sodium  143 mmol/L  Normal  
135-145  



 Panel    [address]          



     [place] [address] [phone]          









 Potassium  4.1 mmol/L  Normal  3.5-5.0  

 

 Chloride  103 mmol/L  Normal  101-111  

 

 Co2 Carbon Dioxide  34 mmol/L  High  22-32  

 

 Anion Gap  6 mmol/L  Normal  2-11  

 

 Glucose  136 mg/dL  High    

 

 Blood Urea Nitrogen  24 mg/dL  Normal  6-24  

 

 Creatinine  1.12 mg/dL  Normal  0.67-1.17  

 

 BUN/Creatinine Ratio  21.4  High  8-20  

 

 Calcium  9.6 mg/dL  Normal  8.6-10.3  

 

 Total Protein  7.0 g/dL  Normal  6.4-8.9  

 

 Albumin  4.2 g/dL  Normal  3.2-5.2  

 

 Globulin  2.8 g/dL  Normal  2-4  

 

 Albumin/Globulin Ratio  1.5  Normal  1-3  

 

 Total Bilirubin  0.90 mg/dL  Normal  0.2-1.0  

 

 Alkaline Phosphatase  107 U/L  High    

 

 Alt  23 U/L  Normal  7-52  

 

 Ast  31 U/L  Normal  13-39  

 

 Egfr Non-  62.5    >60  

 

 Egfr   75.6    >60  4









 INR/Protime  2020  [facility]  INR  1.78  High  0.82-1.09  5



     [address] [place] [address] [phone]          

 

 Laboratory test  2020  [facility]  [facility] INR  2.5      



 finding    (   )-   -          

 

 Comp Metabolic  2020  [facility]  Sodium  140 mmol/L  Normal  
135-145  



 Panel    [address] [place] [address] [phone]          









 Potassium  4.4 mmol/L  Normal  3.5-5.0  

 

 Chloride  98 mmol/L  Low  101-111  

 

 Co2 Carbon Dioxide  35 mmol/L  High  22-32  

 

 Anion Gap  7 mmol/L  Normal  2-11  

 

 Glucose  103 mg/dL  High    

 

 Blood Urea Nitrogen  28 mg/dL  High  6-24  

 

 Creatinine  1.15 mg/dL  Normal  0.67-1.17  

 

 BUN/Creatinine Ratio  24.3  High  8-20  

 

 Calcium  9.8 mg/dL  Normal  8.6-10.3  

 

 Total Protein  6.9 g/dL  Normal  6.4-8.9  

 

 Albumin  4.2 g/dL  Normal  3.2-5.2  

 

 Globulin  2.7 g/dL  Normal  2-4  

 

 Albumin/Globulin Ratio  1.6  Normal  1-3  

 

 Total Bilirubin  0.90 mg/dL  Normal  0.2-1.0  

 

 Alkaline Phosphatase  98 U/L  Normal    

 

 Alt  21 U/L  Normal  7-52  

 

 Ast  30 U/L  Normal  13-39  

 

 Egfr Non-  60.6    >60  

 

 Egfr   73.3    >60  6









 INR/Protime  2020  [facility]  INR  2.83  High  0.82-1.09  7



     [address] [place] [address] [phone]          

 

 CMP  01/15/2020  N2N/CCD Import  Albumin QN  3.8      



       Serum/Plasma        









 Alt - SGPT  33      

 

 Calcium Ser/Plasma Mass/Vol  9.3      

 

 Carbon Dioxide QN Ser/Plas  31      

 

 Chloride Serum/Plasma  102      

 

 Creatinine QN Serum [facility]  1.9      

 

 Glucose Serum  90      

 

 Alkaline Phosphatase QN Ser/PL  91      

 

 Potassium QN Ser/PL Mols/Vol  5.1      

 

 Protein Total QN Ser/Plas  7.8      

 

 Sodium QN Ser/Plasma  138      

 

 Ast - Sgot  23      

 

 BUN - Urea Nitrogen Ser/Plas  57      

 

 Bilirubin Total Mass/Vol  0.7      

 

 GFR   36      

 

 GFR   44      









 INR/Protime  2020  [facility]  INR  2.85  High  0.82-1.09  8



     [address] [place], [place] [id number] [phone]          

 

 Laboratory test  2020  [facility]  Magnesium  1.8 mg/dL  Low  
1.9-2.7  



 finding    [address] [place] [address] [phone]          









 Thyroxine  6.85 g/dL  Normal  6.09-12.23  

 

 TSH (Thyroid Stim Horm)  3.53 mcIU/mL  Normal  0.34-5.60  

 

 Free T4 (Free Thyroxine)  1.09 ng/dL  Normal  0.61-1.12  









 Comp Metabolic  2020  [facility]  Sodium  139 mmol/L  Normal  
135-145  



 Panel    [address] [place] [address] [phone]          









 Chloride  99 mmol/L  Low  101-111  

 

 Co2 Carbon Dioxide  32 mmol/L  Normal  22-32  

 

 Glucose  73 mg/dL  Normal    

 

 Blood Urea Nitrogen  48 mg/dL  High  6-24  

 

 Creatinine  1.75 mg/dL  High  0.67-1.17  

 

 BUN/Creatinine Ratio  27.4  High  8-20  

 

 Calcium  10.1 mg/dL  Normal  8.6-10.3  

 

 Total Protein  7.3 g/dL  Normal  6.4-8.9  

 

 Albumin  4.4 g/dL  Normal  3.2-5.2  

 

 Globulin  2.9 g/dL  Normal  2-4  

 

 Albumin/Globulin Ratio  1.5  Normal  1-3  

 

 Total Bilirubin  0.80 mg/dL  Normal  0.2-1.0  

 

 Alkaline Phosphatase  93 U/L  Normal    

 

 Alt  24 U/L  Normal  7-52  

 

 Ast  32 U/L  Normal  13-39  

 

 Egfr Non-  37.3    >60  

 

 Egfr   45.2    >60  9

 

 Potassium  6.0 mmol/L  High  3.5-5.0  

 

 Anion Gap  8 mmol/L  Normal  2-11  









 Laboratory test  2020  [facility]  B-Type  221 pg/mL  High  <=
100  



 finding    [address]  Natriuretic        



     [place] [address]  Peptide BNP        



     [phone]          

 

 INR/Protime  2020  [facility]  INR  3.25  High  0.82-1.09  10



     [address]          



     [place] [address]          



     [phone]          

 

 INR/Protime  2019  [facility]  INR  2.04  High  0.82-1.09  11



     [address]          



     [place] [address]          



     [phone]          

 

 INR/Protime  2019  [facility]  INR  2.08  High  0.82-1.09  12



     [address]          



     [place] [address]          



     [phone]          

 

 INR/Protime  2019  [facility]  INR  1.82  High  0.82-1.09  13



     [address]          



     [place] [address]          



     [phone]          

 

 INR/Protime  2019  [facility]  INR  2.33  High  0.82-1.09  14



     [address]          



     [place] [address]          



     [phone]          

 

 INR/Protime  2019  [facility]  INR  2.54  High  0.82-1.09  15



     [address]          



     [place] [address]          



     [phone]          

 

 INR/Protime  10/31/2019  [facility]  INR  1.65  High  0.82-1.09  16



     [address]          



     [place] [address]          



     [phone]          

 

 INR/Protime  10/24/2019  [facility]  INR  1.90  High  0.82-1.09  17



     [address]          



     [place] [address]          



     [phone]          

 

 Protime W/ INR  10/17/2019  [facility]  INR  1.84  High  0.82-1.09  
18



     [address]          



     [place] [address] [phone]          

 

 Protime W/ INR  10/03/2019  [facility]  INR  2.36  High  0.82-1.09  
19



     [address]          



     [place] [address] [phone]          









 1  ORDERED 8.22.19  EXPIRES 2.22.20

 

 2  Standard intensity warfarin therapeutic range: 2.0-3.0



   High intensity warfarin therapeutic range: 2.5-3.5

 

 3  *******Because ethnic data is not always readily available,



   this report includes an eGFR for both -Americans and



   non- Americans.****



   The National Kidney Disease Education Program (NKDEP) does



   not endorse the use of the MDRD equation for patients that



   are not between the ages of 18 and 70, are pregnant, have



   extremes of body size, muscle mass, or nutritional status,



   or are non- or non-.



   According to the National Kidney Foundation, irrespective of



   diagnosis, the stage of the disease is based on the level of



   kidney function:



   Stage Description                      GFR(mL/min/1.73 m(2))



   1     Kidney damage with normal or decreased GFR       90



   2     Kidney damage with mild decrease in GFR          60-89



   3     Moderate decrease in GFR                         30-59



   4     Severe decrease in GFR                           15-29



   5     Kidney failure                       <15 (or dialysis)

 

 4  *******Because ethnic data is not always readily available,



   this report includes an eGFR for both -Americans and



   non- Americans.****



   The National Kidney Disease Education Program (NKDEP) does



   not endorse the use of the MDRD equation for patients that



   are not between the ages of 18 and 70, are pregnant, have



   extremes of body size, muscle mass, or nutritional status,



   or are non- or non-.



   According to the National Kidney Foundation, irrespective of



   diagnosis, the stage of the disease is based on the level of



   kidney function:



   Stage Description                      GFR(mL/min/1.73 m(2))



   1     Kidney damage with normal or decreased GFR       90



   2     Kidney damage with mild decrease in GFR          60-89



   3     Moderate decrease in GFR                         30-59



   4     Severe decrease in GFR                           15-29



   5     Kidney failure                       <15 (or dialysis)

 

 5  Standard intensity warfarin therapeutic range: 2.0-3.0



   High intensity warfarin therapeutic range: 2.5-3.5

 

 6  *******Because ethnic data is not always readily available,



   this report includes an eGFR for both -Americans and



   non- Americans.****



   The National Kidney Disease Education Program (NKDEP) does



   not endorse the use of the MDRD equation for patients that



   are not between the ages of 18 and 70, are pregnant, have



   extremes of body size, muscle mass, or nutritional status,



   or are non- or non-.



   According to the National Kidney Foundation, irrespective of



   diagnosis, the stage of the disease is based on the level of



   kidney function:



   Stage Description                      GFR(mL/min/1.73 m(2))



   1     Kidney damage with normal or decreased GFR       90



   2     Kidney damage with mild decrease in GFR          60-89



   3     Moderate decrease in GFR                         30-59



   4     Severe decrease in GFR                           15-29



   5     Kidney failure                       <15 (or dialysis)

 

 7  Standard intensity warfarin therapeutic range: 2.0-3.0



   High intensity warfarin therapeutic range: 2.5-3.5

 

 8  Standard intensity warfarin therapeutic range: 2.0-3.0



   High intensity warfarin therapeutic range: 2.5-3.5

 

 9  *******Because ethnic data is not always readily available,



   this report includes an eGFR for both -Americans and



   non- Americans.****



   The National Kidney Disease Education Program (NKDEP) does



   not endorse the use of the MDRD equation for patients that



   are not between the ages of 18 and 70, are pregnant, have



   extremes of body size, muscle mass, or nutritional status,



   or are non- or non-.



   According to the National Kidney Foundation, irrespective of



   diagnosis, the stage of the disease is based on the level of



   kidney function:



   Stage Description                      GFR(mL/min/1.73 m(2))



   1     Kidney damage with normal or decreased GFR       90



   2     Kidney damage with mild decrease in GFR          60-89



   3     Moderate decrease in GFR                         30-59



   4     Severe decrease in GFR                           15-29



   5     Kidney failure                       <15 (or dialysis)

 

 10  Standard intensity warfarin therapeutic range: 2.0-3.0



   High intensity warfarin therapeutic range: 2.5-3.5

 

 11  Standard intensity warfarin therapeutic range: 2.0-3.0



   High intensity warfarin therapeutic range: 2.5-3.5

 

 12  Standard intensity warfarin therapeutic range: 2.0-3.0



   High intensity warfarin therapeutic range: 2.5-3.5

 

 13  Standard intensity warfarin therapeutic range: 2.0-3.0



   High intensity warfarin therapeutic range: 2.5-3.5

 

 14  Standard intensity warfarin therapeutic range: 2.0-3.0



   High intensity warfarin therapeutic range: 2.5-3.5

 

 15  Standard intensity warfarin therapeutic range: 2.0-3.0



   High intensity warfarin therapeutic range: 2.5-3.5

 

 16  Standard intensity warfarin therapeutic range: 2.0-3.0



   High intensity warfarin therapeutic range: 2.5-3.5

 

 17  Standard intensity warfarin therapeutic range: 2.0-3.0



   High intensity warfarin therapeutic range: 2.5-3.5

 

 18  Standard intensity warfarin therapeutic range: 2.0-3.0



   High intensity warfarin therapeutic range: 2.5-3.5

 

 19  Standard intensity warfarin therapeutic range: 2.0-3.0



   High intensity warfarin therapeutic range: 2.5-3.5







Procedures







 Date  Code  Description  Status

 

 2020  92101  Anticoagulant MGMT For Patient Taking Warfarin, Inc Review  
Completed



     & Intr  

 

 2020  51988  Remove Impact Cerumen Irrigati  Completed

 

 2020  54709  Anticoagulant MGMT For Patient Taking Warfarin, Inc Review  
Completed



     & Intr  

 

 2020  29332  Remove Impact Cerumen Irrigati  Completed

 

 2020  38543  Remove Impact Cerumen Irrigati  Completed

 

 2020  78803  Anticoagulant MGMT For Patient Taking Warfarin, Inc Review  
Completed



     & Intr  

 

 2020  57869  Remove Impact Cerumen Irrigati  Completed

 

 2020  38523  Anticoagulant MGMT For Patient Taking Warfarin, Inc Review  
Completed



     & Intr  

 

 2020  33980  Anticoagulant MGMT For Patient Taking Warfarin, Inc Review  
Completed



     & Intr  

 

 2020  32077  Anticoagulant MGMT For Patient Taking Warfarin, Inc Review  
Completed



     & Intr  

 

 2019  82544  Anticoagulant MGMT For Patient Taking Warfarin, Inc Review  
Completed



     & Intr  

 

 12/10/2019  29383  Anticoagulant MGMT For Patient Taking Warfarin, Inc Review  
Completed



     & Intr  

 

 2019  93108  Anticoagulant MGMT For Patient Taking Warfarin, Inc Review  
Completed



     & Intr  

 

 2019  05815  Anticoagulant MGMT For Patient Taking Warfarin, Inc Review  
Completed



     & Intr  

 

 2019  53340  Anticoagulant MGMT For Patient Taking Warfarin, Inc Review  
Completed



     & Intr  

 

 10/25/2019  97512  Anticoagulant MGMT For Patient Taking Warfarin, Inc Review  
Completed



     & Intr  







[employer]







 Description

 

 No Information Available







Encounters







 Type  Date  Location  Provider  Dx  Diagnosis

 

 Office Visit  2020  [facility] Darron Castellanos MD  H61.23  Impacted 
cerumen,



   3:45p        bilateral

 

 Office Visit  2020  [facility] Darron Castellanos MD  I10  Essential (primary
)



   3:00p        hypertension









 J30.89  Other allergic rhinitis

 

 Z23  Encounter for immunization







Assessments







 Date  Code  Description  Provider

 

 2020  I48.19  Other persistent atrial fibrillation  Nurse

 

 2020  H61.23  Impacted cerumen, bilateral  Nurse

 

 2020  H61.23  Impacted cerumen, bilateral  Carin Castellanos MD

 

 2020  I10  Essential (primary) hypertension  Carin Castellanos MD

 

 2020  J30.89  Other allergic rhinitis  Carin Castellanos MD

 

 2020  Z23  Encounter for immunization  Carin Castellanos MD

 

 2019  I48.19  Other persistent atrial fibrillation  Subhash Rod MD







Plan of Treatment

No Information Available



Functional Status







 Description

 

 No Information Available







Mental Status







 Description

 

 No Information Available







Referrals







 Description

 

 No Information Available

## 2020-03-11 NOTE — XMS REPORT
Continuity of Care Document (CCD)

 Created on:February 3, 2020



Patient:Jorge Castañeda

Sex:Male

:1935

External Reference #:[id number]





Demographics







 Address  [address] [place] [address]

 

 Mobile Phone  [phone]

 

 Email Address  [email or web address]

 

 Preferred Language  en

 

 Marital Status  Not  or 

 

 Confucianist Affiliation  Unknown

 

 Race  White

 

 Ethnic Group  Not  or 









Author







 Name  Gisela Amor NP (transmitted by agent of provider Salima Salcedo)

 

 Address  [address] [place] [id number]









Care Team Providers







 Name  Role  Phone

 

 Brock Engle MD - Cardiovascular  Care Team Information   [phone]
[phone]



 Disease    

 

 Carin Castellanos M.D. - Family  Care Team Information   [phone]
[phone]



 Medicine    









Problems







 Active Problems  Provider  Date

 

 Atrial fibrillation  Taylor Gerber M.D.  Onset: 2015

 

 Essential hypertension  Taylor Gerber M.D.  Onset: 2015

 

 Tricuspid valve disorder, non-rheumatic  Taylor Gerber M.D.  Onset: 2015

 

 Chronic atrial fibrillation  Taylor Gerber M.D.  Onset: 2016

 

 Chronic obstructive lung disease  Nissa Beasley MD  Onset: 10/04/2016

 

 Cardiomyopathy  Taylor Gerber M.D.  Onset: 10/19/2017

 

 Mitral valve disorder  Taylor Gerber M.D.  Onset: 2017

 

 Pulmonary hypertension due to left heart disease  Taylor Gerber M.D.  Onset: 

 

 Premature beats  Taylor Gerber M.D.  Onset: 2018

 

 Paroxysmal atrial fibrillation  Taylor Gerber M.D.  Onset: 2018

 

 Cardiac pacemaker in situ  Taylor Gerber M.D.  Onset: 2018

 

 Pulmonary hypertension due to left heart disease  Taylor Gerber M.D.  Onset: 

 

 Chronic combined systolic and diastolic heart  Taylor Gerber M.D.  Onset: 



 failure    







Social History







 Type  Date  Description  Comments

 

 Birth Sex    Unknown  

 

 Tobacco Use  Start: Unknown End:  Former Cigarette Smoker  



   Unknown    

 

 Smoking Status  Reviewed: 20  Former Cigarette Smoker  

 

 ETOH Use    Vodka  4oz per day

 

 Recreational Drug Use    Denies Drug Use  

 

 Tobacco Use  Start: Unknown End:  Patient is a former  quit in 



   Unknown  smoker  

 

 Exercise Type/Frequency    Exercises regularly  5 days/week at gym







Allergies, Adverse Reactions, Alerts







 Active Allergies  Reaction  Severity  Comments  Date

 

 Rythmol  dizziness  Moderate    10/03/2012

 

 Metoprolol Succinate        2018







Medications







 Active Medications  SIG  Qnty  Indications  Ordering  Date



         Provider  

 

 Furosemide  2 by mouth every  180tabs    Taylor Gerber,  2018



          20mg Tablets  day      M.D.  



           

 

 Pulse Oximeter  use as instructed  [facility]  R09.02  Nissa Beasley,  2017



               [facility]        MD  



           

 

 Oxygen  please use o2 at  1units    Nissa Beasley,  2017



       Misc  1L/min during      MD  



   exertion        

 

 Multivitamins  1 tablet po daily      Unknown  

 

 Simvastatin  1 by mouth every  90tabs    Taylor Gerber,  



           20mg  day      M.D.  



 Tablets          



           

 

 Warfarin  1 tablet for 2      Unknown  



        2mg Tablets  days and 2.5        



   tablet for 5 days        



   as directed (        



   adjustment Dr. Curiel)        

 

 Timolol Maleate  1 drop per eye 3      Unknown  



               0.25%  times a day        



 Solution          



           

 

 Pilocarpine HCL  1 gtt in both eyes      Unknown  



               1%  1 x month        



 Solution          



           







Medications Administered in Office







 Medication  SIG  Qnty  Indications  Ordering Provider  Date

 

 Inj, Regadenoson, 0.1 MG        Jus Coles M.D.,  2015



                  Injection        SEJAL GALINDO  



           

 

 Inj, Regadenoson, 0.1 MG        Taylor Gerber M.D.  2015



                  Injection          



           

 

 Technetium TC 99M        Jus Coles M.D.,  2015



 Tetrofosmin, Per Unit Dose        SEJAL GALINDO  



 Up To 40 Millicuries          



              Injection          



           

 

 Technetium TC 99M        Taylor Gerber M.D.  2015



 Tetrofosmin, Per Unit Dose          



 Up To 40 Millicuries          



              Injection          



           







Immunizations







 CPT Code  Status  Date  Vaccine  Lot #

 

 [id number]  Given  10/04/2016  Influenza Virus Vaccine, Quadrivalent, Split,  
[id number]



       Preservative Free  







Vital Signs







 Date  Vital  Result  Comment

 

 2020  1:32pm  Height  67 inches  5'7"









 Weight  168.00 lb  with shoes

 

 Heart Rate  61 /min  

 

 BP Systolic Sitting  104 mmHg  Ra

 

 BP Diastolic Sitting  66 mmHg  Ra

 

 BP Systolic Standing  104 mmHg  Ra

 

 BP Diastolic Standing  76 mmHg  Ra

 

 BMI (Body Mass Index)  26.3 kg/m2  

 

 Ejection Fraction  45-50%  Echo 2020  1:46pm  Height  67 inches  5'7"









 Weight  165.00 lb  

 

 Heart Rate  72 /min  

 

 BP Systolic  100 mmHg  

 

 BP Diastolic  60 mmHg  

 

 O2 % BldC Oximetry  98 %  

 

 BMI (Body Mass Index)  25.8 kg/m2  







Results







 Test  Acquired Date  Facility  Test  Result  H/L  Range  Note

 

 Comp Metabolic  2020  [facility]  Sodium  139 mmol/L  Normal  
135-145  



 Panel    [address] [place] [address] [phone]          









 Chloride  99 mmol/L  Low  101-111  

 

 Co2 Carbon Dioxide  32 mmol/L  Normal  22-32  

 

 Glucose  73 mg/dL  Normal    

 

 Blood Urea Nitrogen  48 mg/dL  High  6-24  

 

 Creatinine  1.75 mg/dL  High  0.67-1.17  

 

 BUN/Creatinine Ratio  27.4  High  8-20  

 

 Calcium  10.1 mg/dL  Normal  8.6-10.3  

 

 Total Protein  7.3 g/dL  Normal  6.4-8.9  

 

 Albumin  4.4 g/dL  Normal  3.2-5.2  

 

 Globulin  2.9 g/dL  Normal  2-4  

 

 Albumin/Globulin Ratio  1.5  Normal  1-3  

 

 Total Bilirubin  0.80 mg/dL  Normal  0.2-1.0  

 

 Alkaline Phosphatase  93 U/L  Normal    

 

 Alt  24 U/L  Normal  7-52  

 

 Ast  32 U/L  Normal  13-39  

 

 Egfr Non-  37.3    >60  

 

 Egfr   45.2    >60  1

 

 Potassium  6.0 mmol/L  High  3.5-5.0  

 

 Anion Gap  8 mmol/L  Normal  2-11  









 Laboratory test  2020  [facility]  Magnesium  1.8 mg/dL  Low  
1.9-2.7  



 finding    [address]          



     [place] [address] [phone]          









 B-Type Natriuretic Peptide BNP  221 pg/mL  High  <=100  









 Thyroid  2020  [facility]  Free T4 (Free  1.09  Normal  0.61-
1.12  



 Panel    [address]  Thyroxine)  ng/dL      



     [place] [address]          



     [phone]          









 Thyroxine  6.85 g/dL  Normal  6.09-12.23  

 

 TSH (Thyroid Stim Horm)  3.53 mcIU/mL  Normal  0.34-5.60  









 1  *******Because ethnic data is not always readily available,



   this report includes an eGFR for both -Americans and



   non- Americans.****



   The National Kidney Disease Education Program (NKDEP) does



   not endorse the use of the MDRD equation for patients that



   are not between the ages of 18 and 70, are pregnant, have



   extremes of body size, muscle mass, or nutritional status,



   or are non- or non-.



   According to the National Kidney Foundation, irrespective of



   diagnosis, the stage of the disease is based on the level of



   kidney function:



   Stage Description                      GFR(mL/min/1.73 m(2))



   1     Kidney damage with normal or decreased GFR       90



   2     Kidney damage with mild decrease in GFR          60-89



   3     Moderate decrease in GFR                         30-59



   4     Severe decrease in GFR                           15-29



   5     Kidney failure                       <15 (or dialysis)







Procedures







 Date  Code  Description  Status

 

 2020  84012  EKG Tracing & Interpretation  Completed

 

 2020  31544  ECHO Transthoracic, Real-Time 2D With Doppler And Color  
Completed



     Flow  

 

 2019  94797  Icd Eval Sing,Dual,Multi Lead Remote Recpt Transm Tech Rev  
Completed



     Tech S  

 

 2019  54944  Icd Eval Sing,Dual,Multi Lead Remote Recpt Transm Tech Rev  
Completed



     Tech S  

 

 2019  90333  Pacemaker Check Remote Up To 90Days Single,Dual,Multiple  
Completed



     Lead  

 

 2019  72957  Pacemaker Check Remote Up To 90Days Single,Dual,Multiple  
Completed



     Lead  

 

 2019  69452  Icd Eval Sing,Dual,Multi Lead Remote Recpt Transm Tech Rev  
Completed



     Tech S  

 

 2019  66496  Icd Eval Sing,Dual,Multi Lead Remote Recpt Transm Tech Rev  
Completed



     Tech S  

 

 2019  07332  Pacemaker Check Remote Up To 90Days Single,Dual,Multiple  
Completed



     Lead  

 

 2019  80547  Pacemaker Check Remote Up To 90Days Single,Dual,Multiple  
Completed



     Lead  







[employer]







 Description

 

 No Information Available







Encounters







 Type  Date  Location  Provider  Dx  Diagnosis

 

 Office Visit  2020  [facility]  Nissa Gale,  J44.9  Chronic 
obstructive



   1:45p  [facility]  MD    pulmonary disease,



     [facility]      unspecified









 R09.02  Hypoxemia







Assessments







 Date  Code  Description  Provider

 

 2020  I49.3  Ventricular premature depolarization  Gisela Amor NP

 

 2020  I42.9  Cardiomyopathy, unspecified  Gisela Amor, TAE

 

 2020  I48.21  Permanent atrial fibrillation  Gisela Amor NP

 

 2020  Z95.0  Presence of cardiac pacemaker  Gisela Amor NP

 

 2020  J44.9  Chronic obstructive pulmonary disease,  Nissa Beasley MD



     unspecified  

 

 2020  R09.02  Hypoxemia  Nissa Beasley MD

 

 2020  I42.9  Cardiomyopathy, unspecified  Taylor Gerber M.D.

 

 2020  I42.9  Cardiomyopathy, unspecified  Traveling ECHO 2

 

 2020  I48.21  Permanent atrial fibrillation  Traveling ECHO 2

 

 2020  I49.3  Ventricular premature depolarization  Traveling ECHO 2

 

 2019  I48.21  Permanent atrial fibrillation  Taylor Gerber M.D.

 

 2019  I48.21  Permanent atrial fibrillation  Remote Device Checks

 

 2019  Z95.0  Presence of cardiac pacemaker  Taylor Gerber M.D.

 

 2019  Z95.0  Presence of cardiac pacemaker  Remote Device Checks

 

 2019  I48.2  Chronic atrial fibrillation  Taylor Gerber M.D.

 

 2019  I48.2  Chronic atrial fibrillation  Remote Device Checks

 

 2019  Z95.0  Presence of cardiac pacemaker  Taylor Gerber M.D.

 

 2019  Z95.0  Presence of cardiac pacemaker  Remote Device Checks







Plan of Treatment

Future Appointment(s):2020  3:20 pm - Gisela Amor NP at [facility]2020 10:00 am - [facility] AT Usqwvzdo74/10/2020 10:00 am - Nurse Visit [facility] AT Caqdcuck24/25/2021  3:00 pm - Anh Mckoy NP at [facility]2020  2:00 pm - Jarad Singer MD at [facility] Dermatology AT Yruvkxsz63/03/2020 - Gisela Thuman, NPI49.3 
Ventricular premature depolarizationNew Orders:Holter Monitor, Ordered: I42.9 Cardiomyopathy, unspecifiedFollow up:follow up in 1.5-2 weeks with 
Gisela  Follow up with Dr. Gerber in 3 months.Recommendations:Please reduce 
Lasix to 20mg by mouth daily ( 1 tablet by mouth daily)  increase your oral 
intake.  At your next appointment we can consider adding back Lisinopril  
Please weigh yourself daily, monitor swelling in your legs.I48.21 Permanent 
atrial [id number] Presence of cardiac pacemaker



Functional Status







 Description

 

 No Information Available







Mental Status







 Description

 

 No Information Available







Referrals







 Description

 

 No Information Available

## 2020-03-11 NOTE — XMS REPORT
Continuity of Care Document (CCD)

 Created on:2020



Patient:Jorge Castañeda

Sex:Male

:1935

External Reference #:[id number]





Demographics







 Address  [address] [place] [address]

 

 Home Phone  [phone]

 

 Preferred Language  Unknown

 

 Marital Status  Unknown

 

 [employer] Affiliation  Unknown

 

 Race  Unknown

 

 Ethnic Group  Unknown









Author







 Name  Carin Castellanos MD

 

 Address  [address] [place] [address]









Problems







 Active Problems  Provider  Date

 

 Essential hypertension    Onset: 2019

 

 Multiple premature ventricular complexes    Onset: 2018

 

 Chronic obstructive lung disease    Onset: 2018

 

 Pulmonary hypertension    Onset: 2017

 

 Bilateral hearing loss    Onset: 10/24/2017

 

 History of malignant neoplasm of prostate    Onset: 10/23/2017

 

 Hyperlipidemia    Onset: 10/23/2017

 

 Kidney stone    Onset: 10/23/2017









 Inactive Problems









 Pulmonary embolism    Onset: 2019









 Inactive: 2019







Social History







 Type  Date  Description  Comments

 

 Birth Sex    Unknown  

 

 Tobacco Use  Start: Unknown End: Unknown  Patient is a former smoker  quit in 


 

 Smoking Status  Reviewed: 20  Patient is a former smoker  quit in 







Allergies, Adverse Reactions, Alerts







 Active Allergies  Reaction  Severity  Comments  Date

 

 Diltiazem  leg edema, malaise.  Moderate    2019

 

 Metoprolol  wheezing, exacerbated COPD  Moderate    2019

 

 Rhythmol  dizziness  Moderate    2019







Medications







 Active Medications  SIG  Qnty  Indications  Ordering  Date



         Provider  

 

 Simvastatin  1  daily Oral  90tabs    Unknown  2019



            20mg          



 Tablets          



           

 

 Aldactone  1  daily Oral  30tabs    Unknown  2019



          25mg          



 Tablets          



           

 

 Warfarin Sodium  Oral; Take 1  90tabs    Unknown  2019



                2.5mg  Tablet By Mouth        



 Tablets  Every Day as        



   Directed        

 

 Furosemide  2 by mouth M W F 1  30tabs    Unknown  10/23/2018



           20mg  by mouth T TH Sat        



 Tablets  Sun        



           

 

 Lisinopril  1  daily Oral  90tabs    Unknown  10/23/2018



           10mg          



 Tablets          



           

 

 Warfarin Sodium  Oral; Take 1  90tabs    Unknown  2018



                2mg  Tablet By Mouth        



 Tablets  Every Day        



           

 

 Warfarin Sodium  2-3  daily Oral  90tabs    Unknown  2018



                1mg          



 Tablets          



           

 

 Pilocarpine HCL  1 drop 4 times a  15units    Unknown  10/23/2017



                1%  day Ophthalmic        



 Solution          



           

 

 Timolol Maleate  1   Ophthalmic;      Unknown  10/23/2017



 Ophthalmic Gel  both eyes every 12        



 Forming  hours.        



        0.25% GFS          



           







Immunizations







 CPT Code  Status  Date  Vaccine  Lot #

 

 [id number]  Given  2020  Flu High Dose  [id number]

 

 07786  Given  10/23/2018  Flu High Dose  

 

 99548  Given  10/23/2017  Flu High Dose  







Vital Signs







 Date  Vital  Result  Comment

 

 2020  3:06pm  BP Systolic  116 mmHg  









 BP Diastolic  66 mmHg  

 

 Heart Rate  69 /min  

 

 Body Temperature  98.2 F  

 

 O2 % BldC Oximetry  98 %  









 2019 11:16am  BP Systolic  144 mmHg  









 Heart Rate  68 /min  

 

 O2 % BldC Oximetry  92 %  







Results







 Test  Acquired Date  Facility  Test  Result  H/L  Range  Note

 

 INR/Protime  2020  [facility]  INR  2.83  High  0.82-1.09  1, 
2



     [address]          



     [place] [address] [phone]          

 

 CMP  01/15/2020  N2N/CCD Import  Albumin QN  3.8      



       Serum/Plasma        









 Alt - SGPT  33      

 

 Calcium Ser/Plasma Mass/Vol  9.3      

 

 Carbon Dioxide QN Ser/Plas  31      

 

 Chloride Serum/Plasma  102      

 

 Creatinine QN Serum MCNC  1.9      

 

 Glucose Serum  90      

 

 Alkaline Phosphatase QN Ser/PL  91      

 

 Potassium QN Ser/PL Mols/Vol  5.1      

 

 Protein Total QN Ser/Plas  7.8      

 

 Sodium QN Ser/Plasma  138      

 

 Ast - Sgot  23      

 

 BUN - Urea Nitrogen Ser/Plas  57      

 

 Bilirubin Total Mass/Vol  0.7      

 

 GFR   36      

 

 GFR   44      









 Laboratory test  2020  [facility]  B-Type  221 pg/mL  High  <=
100  



 finding    [address]  Natriuretic        



     [place] [address]  Peptide BNP        



     [phone]          

 

 Comp Metabolic  2020  [facility]  Sodium  139  Normal  135-
145  



 Panel    [address]    mmol/L      



     [place] [address]          



     [phone]          









 Chloride  99 mmol/L  Low  101-111  

 

 Co2 Carbon Dioxide  32 mmol/L  Normal  22-32  

 

 Glucose  73 mg/dL  Normal    

 

 Blood Urea Nitrogen  48 mg/dL  High  6-24  

 

 Creatinine  1.75 mg/dL  High  0.67-1.17  

 

 BUN/Creatinine Ratio  27.4  High  8-20  

 

 Calcium  10.1 mg/dL  Normal  8.6-10.3  

 

 Total Protein  7.3 g/dL  Normal  6.4-8.9  

 

 Albumin  4.4 g/dL  Normal  3.2-5.2  

 

 Globulin  2.9 g/dL  Normal  2-4  

 

 Albumin/Globulin Ratio  1.5  Normal  1-3  

 

 Total Bilirubin  0.80 mg/dL  Normal  0.2-1.0  

 

 Alkaline Phosphatase  93 U/L  Normal    

 

 Alt  24 U/L  Normal  7-52  

 

 Ast  32 U/L  Normal  13-39  

 

 Egfr Non-  37.3    >60  

 

 Egfr   45.2    >60  3

 

 Potassium  6.0 mmol/L  High  3.5-5.0  

 

 Anion Gap  8 mmol/L  Normal  2-11  









 Laboratory test  2020  [facility]  Magnesium  1.8 mg/dL  Low  
1.9-2.7  



 finding    [address] [place] [address]          



     [phone]          









 Thyroxine  6.85 g/dL  Normal  6.09-12.23  

 

 TSH (Thyroid Stim Horm)  3.53 mcIU/mL  Normal  0.34-5.60  

 

 Free T4 (Free Thyroxine)  1.09 ng/dL  Normal  0.61-1.12  









 INR/Protime  2020  [facility]  INR  2.85  High  0.82-1.09  4



     [address]          



     [place] [address]          



     [phone]          

 

 INR/Protime  2020  [facility]  INR  3.25  High  0.82-1.09  5



     [address]  [place] [address]          



     [phone]          

 

 INR/Protime  2019  [facility]  INR  2.04  High  0.82-1.09  6



     [address]  [place] [address]          



     [phone]          

 

 INR/Protime  2019  [facility]  INR  2.08  High  0.82-1.09  7



     [address]          



     [place] [address]          



     [phone]          

 

 INR/Protime  2019  [facility]  INR  1.82  High  0.82-1.09  8



     [address]  [place] [address]          



     [phone]          

 

 INR/Protime  2019  [facility]  INR  2.33  High  0.82-1.09  9



     [address]  [place] [address]          



     [phone]          

 

 INR/Protime  2019  [facility]  INR  2.54  High  0.82-1.09  10



     [address]          



     [place], [place] [address] [phone]          

 

 INR/Protime  10/31/2019  [facility]  INR  1.65  High  0.82-1.09  11



     [address]          



     [place], [place] [address] [phone]          

 

 INR/Protime  10/24/2019  [facility]  INR  1.90  High  0.82-1.09  12



     [address]          



     [place], [place] [address] [phone]          

 

 Protime W/ INR  10/17/2019  [facility]  INR  1.84  High  0.82-1.09  
13



     [address]          



     [place] [address] [phone]          

 

 Protime W/ INR  10/03/2019  [facility]  INR  2.36  High  0.82-1.09  
14



     [address]          



     [place], [place] [address] [phone]          

 

 Coumadin Dx  2019  N2N/CCD Import  Coumadin Dx  Pe      

 

 INR  2019  N2N/CCD Import  INR  1.45 _    -  

 

 INR Goal  2019  N2N/CCD Import  INR Goal  2.0 to 3.0      

 

 Recheck INR  2019  N2N/CCD Import  Recheck INR  2019      

 

 INR  2019  N2N/CCD Import  INR  1.45 _  High  0.82-1.09  

 

 Coumadin Dx  08/15/2019  N2N/CCD Import  Coumadin Dx  Pe      

 

 INR  08/15/2019  N2N/CCD Import  INR  1.70 _    -  

 

 INR Goal  08/15/2019  N2N/CCD Import  INR Goal  2.0 to 3.0      

 

 Recheck INR  08/15/2019  N2N/CCD Import  Recheck INR  2 weeks      



         2019      

 

 INR  2019  N2N/CCD Import  INR  1.70 _  High  0.82-1.09  









 1  ORDERED 8..19  EXPIRES .

 

 2  Standard intensity warfarin therapeutic range: 2.0-3.0



   High intensity warfarin therapeutic range: 2.5-3.5

 

 3  *******Because ethnic data is not always readily available,



   this report includes an eGFR for both -Americans and



   non- Americans.****



   The National Kidney Disease Education Program (NKDEP) does



   not endorse the use of the MDRD equation for patients that



   are not between the ages of 18 and 70, are pregnant, have



   extremes of body size, muscle mass, or nutritional status,



   or are non- or non-.



   According to the National Kidney Foundation, irrespective of



   diagnosis, the stage of the disease is based on the level of



   kidney function:



   Stage Description                      GFR(mL/min/1.73 m(2))



   1     Kidney damage with normal or decreased GFR       90



   2     Kidney damage with mild decrease in GFR          60-89



   3     Moderate decrease in GFR                         30-59



   4     Severe decrease in GFR                           15-29



   5     Kidney failure                       <15 (or dialysis)

 

 4  Standard intensity warfarin therapeutic range: 2.0-3.0



   High intensity warfarin therapeutic range: 2.5-3.5

 

 5  Standard intensity warfarin therapeutic range: 2.0-3.0



   High intensity warfarin therapeutic range: 2.5-3.5

 

 6  Standard intensity warfarin therapeutic range: 2.0-3.0



   High intensity warfarin therapeutic range: 2.5-3.5

 

 7  Standard intensity warfarin therapeutic range: 2.0-3.0



   High intensity warfarin therapeutic range: 2.5-3.5

 

 8  Standard intensity warfarin therapeutic range: 2.0-3.0



   High intensity warfarin therapeutic range: 2.5-3.5

 

 9  Standard intensity warfarin therapeutic range: 2.0-3.0



   High intensity warfarin therapeutic range: 2.5-3.5

 

 10  Standard intensity warfarin therapeutic range: 2.0-3.0



   High intensity warfarin therapeutic range: 2.5-3.5

 

 11  Standard intensity warfarin therapeutic range: 2.0-3.0



   High intensity warfarin therapeutic range: 2.5-3.5

 

 12  Standard intensity warfarin therapeutic range: 2.0-3.0



   High intensity warfarin therapeutic range: 2.5-3.5

 

 13  Standard intensity warfarin therapeutic range: 2.0-3.0



   High intensity warfarin therapeutic range: 2.5-3.5

 

 14  Standard intensity warfarin therapeutic range: 2.0-3.0



   High intensity warfarin therapeutic range: 2.5-3.5







Procedures







 Date  Code  Description  Status

 

 2020  16341  Anticoagulant MGMT For Patient Taking Warfarin, Inc Review  
Completed



     & Intr  

 

 2020  06296  Anticoagulant MGMT For Patient Taking Warfarin, Inc Review  
Completed



     & Intr  

 

 2020  85219  Anticoagulant MGMT For Patient Taking Warfarin, Inc Review  
Completed



     & Intr  

 

 2019  78827  Anticoagulant MGMT For Patient Taking Warfarin, Inc Review  
Completed



     & Intr  

 

 12/10/2019  33326  Anticoagulant MGMT For Patient Taking Warfarin, Inc Review  
Completed



     & Intr  

 

 2019  36433  Anticoagulant MGMT For Patient Taking Warfarin, Inc Review  
Completed



     & Intr  

 

 2019  99492  Anticoagulant MGMT For Patient Taking Warfarin, Inc Review  
Completed



     & Intr  

 

 2019  99017  Anticoagulant MGMT For Patient Taking Warfarin, Inc Review  
Completed



     & Intr  

 

 10/25/2019  60509  Anticoagulant MGMT For Patient Taking Warfarin, Inc Review  
Completed



     & Intr  

 

 2019  47937  Anticoagulant MGMT For Patient Taking Warfarin, Inc Review  
Completed



     & Intr  

 

 08/15/2019  98280  Anticoagulant MGMT For Patient Taking Warfarin, Inc Review  
Completed



     & Intr  







[employer]







 Description

 

 No Information Available







Encounters







 Type  Date  Location  Provider  Dx  Diagnosis

 

 Office Visit  2020  [facility]  Carin Castellanos MD  I10  Essential (primary
)



   3:00p        hypertension









 J30.89  Other allergic rhinitis







Assessments







 Date  Code  Description  Provider

 

 2020  I10  Essential (primary) hypertension  Carin Castellanos MD

 

 2020  J30.89  Other allergic rhinitis  Carin Castellanos MD

 

 2019  I48.19  Other persistent atrial fibrillation  Subhash Rod MD







Plan of Treatment

No Information Available



Functional Status







 Description

 

 No Information Available







Mental Status







 Description

 

 No Information Available







Referrals







 Description

 

 No Information Available

## 2020-03-11 NOTE — XMS REPORT
Continuity of Care Document (CCD)

 Created on:2020



Patient:Jorge Castañeda

Sex:Male

:1935

External Reference #:[id number]





Demographics







 Address  [address] [place] [address]

 

 Home Phone  [phone]

 

 Preferred Language  Unknown

 

 Marital Status  Unknown

 

 Zoroastrian Affiliation  Unknown

 

 Race  Unknown

 

 Ethnic Group  Unknown









Author







 Name  Carin Castellanos MD

 

 Address  [address] [place] [address]









Problems







 Active Problems  Provider  Date

 

 Essential hypertension    Onset: 2019

 

 Multiple premature ventricular complexes    Onset: 2018

 

 Chronic obstructive lung disease    Onset: 2018

 

 Pulmonary hypertension    Onset: 2017

 

 Bilateral hearing loss    Onset: 10/24/2017

 

 History of malignant neoplasm of prostate    Onset: 10/23/2017

 

 Atrial fibrillation    Onset: 2018

 

 Hyperlipidemia    Onset: 10/23/2017

 

 Kidney stone    Onset: 10/23/2017

 

 Cardiac pacemaker in situ  Subhash Rod MD  Onset: 02/10/2020









 Inactive Problems









 Pulmonary embolism    Onset: 2019









 Inactive: 2019







Social History







 Type  Date  Description  Comments

 

 Birth Sex    Unknown  

 

 Tobacco Use  Start: Unknown End: Unknown  Patient is a former smoker  quit in 


 

 Smoking Status  Reviewed: 20  Patient is a former smoker  quit in 







Allergies, Adverse Reactions, Alerts







 Active Allergies  Reaction  Severity  Comments  Date

 

 Diltiazem  leg edema, malaise.  Moderate    2019

 

 Metoprolol  wheezing, exacerbated COPD  Moderate    2019

 

 Rhythmol  dizziness  Moderate    2019







Medications







 Active Medications  SIG  Qnty  Indications  Ordering  Date



         Provider  

 

 Simvastatin  1  daily Oral  90tabs    Unknown  2019



            20mg          



 Tablets          



           

 

 Warfarin Sodium  Oral; M/W/F 2  90tabs    Unknown  2019



                2.5mg  Pills        



 Tablets          



           

 

 Furosemide  2 by mouth M W F 1  30tabs    Unknown  10/23/2018



           20mg  by mouth T TH Sat        



 Tablets  Sun        



           

 

 Warfarin Sodium  Oral; Take 1  90tabs    Unknown  2018



                2mg  T/TH/Sat/Sun        



 Tablets          



           

 

 Pilocarpine HCL  1 drop 4 times a  15units    Unknown  10/23/2017



                1%  day Ophthalmic        



 Solution          



           

 

 Timolol Maleate  1   Ophthalmic;      Unknown  10/23/2017



 Ophthalmic Gel  both eyes every 12        



 Forming  hours.        



        0.25% GFS          



           







Immunizations







 CPT Code  Status  Date  Vaccine  Lot #

 

 [id number]  Given  2020  Flu High Dose  ND320KH

 

 88470  Given  10/23/2018  Flu High Dose  

 

 81487  Given  10/23/2017  Flu High Dose  







Vital Signs







 Date  Vital  Result  Comment

 

 2020  4:04pm  BP Systolic  116 mmHg  









 BP Diastolic  68 mmHg  

 

 Height  65.5 inches  5'5.50"

 

 Weight  168.00 lb  

 

 Heart Rate  60 /min  

 

 Body Temperature  96.8 F  

 

 O2 % BldC Oximetry  99 %  

 

 BMI (Body Mass Index)  27.5 kg/m2  









 2020  3:06pm  BP Systolic  116 mmHg  









 BP Diastolic  66 mmHg  

 

 Heart Rate  69 /min  

 

 Body Temperature  98.2 F  

 

 O2 % BldC Oximetry  98 %  







Results







 Test  Acquired Date  Facility  Test  Result  H/L  Range  Note

 

 Comp Metabolic  2020  [facility]  Sodium  143 mmol/L  Normal  
135-145  



 Panel    [address]          



     [place] [address] [phone]          









 Potassium  4.1 mmol/L  Normal  3.5-5.0  

 

 Chloride  103 mmol/L  Normal  101-111  

 

 Co2 Carbon Dioxide  34 mmol/L  High  22-32  

 

 Anion Gap  6 mmol/L  Normal  2-11  

 

 Glucose  136 mg/dL  High    

 

 Blood Urea Nitrogen  24 mg/dL  Normal  6-24  

 

 Creatinine  1.12 mg/dL  Normal  0.67-1.17  

 

 BUN/Creatinine Ratio  21.4  High  8-20  

 

 Calcium  9.6 mg/dL  Normal  8.6-10.3  

 

 Total Protein  7.0 g/dL  Normal  6.4-8.9  

 

 Albumin  4.2 g/dL  Normal  3.2-5.2  

 

 Globulin  2.8 g/dL  Normal  2-4  

 

 Albumin/Globulin Ratio  1.5  Normal  1-3  

 

 Total Bilirubin  0.90 mg/dL  Normal  0.2-1.0  

 

 Alkaline Phosphatase  107 U/L  High    

 

 Alt  23 U/L  Normal  7-52  

 

 Ast  31 U/L  Normal  13-39  

 

 Egfr Non-  62.5    >60  

 

 Egfr   75.6    >60  1









 INR/Protime  2020  [facility]  INR  1.78  High  0.82-1.09  2, 
3



     [address]          



     [place] [address] [phone]          

 

 Laboratory test  2020  [facility] INR  2.5      



 finding    (   )-   -          

 

 Comp Metabolic  2020  [facility]  Sodium  140  Normal  135-
145  



 Panel    [address]    mmol/L      



     [place] [address]          



     [phone]          









 Potassium  4.4 mmol/L  Normal  3.5-5.0  

 

 Chloride  98 mmol/L  Low  101-111  

 

 Co2 Carbon Dioxide  35 mmol/L  High  22-32  

 

 Anion Gap  7 mmol/L  Normal  2-11  

 

 Glucose  103 mg/dL  High    

 

 Blood Urea Nitrogen  28 mg/dL  High  6-24  

 

 Creatinine  1.15 mg/dL  Normal  0.67-1.17  

 

 BUN/Creatinine Ratio  24.3  High  8-20  

 

 Calcium  9.8 mg/dL  Normal  8.6-10.3  

 

 Total Protein  6.9 g/dL  Normal  6.4-8.9  

 

 Albumin  4.2 g/dL  Normal  3.2-5.2  

 

 Globulin  2.7 g/dL  Normal  2-4  

 

 Albumin/Globulin Ratio  1.6  Normal  1-3  

 

 Total Bilirubin  0.90 mg/dL  Normal  0.2-1.0  

 

 Alkaline Phosphatase  98 U/L  Normal    

 

 Alt  21 U/L  Normal  7-52  

 

 Ast  30 U/L  Normal  13-39  

 

 Egfr Non-  60.6    >60  

 

 Egfr   73.3    >60  4









 INR/Protime  2020  [facility]  INR  2.83  High  0.82-1.09  5



     [address]          



     [place] [address]          



     [phone]          

 

 CMP  01/15/2020  N2N/CCD Import  Albumin QN  3.8      



       Serum/Plasma        









 Alt - SGPT  33      

 

 Calcium Ser/Plasma Mass/Vol  9.3      

 

 Carbon Dioxide QN Ser/Plas  31      

 

 Chloride Serum/Plasma  102      

 

 Creatinine QN Serum MCNC  1.9      

 

 Glucose Serum  90      

 

 Alkaline Phosphatase QN Ser/PL  91      

 

 Potassium QN Ser/PL Mols/Vol  5.1      

 

 Protein Total QN Ser/Plas  7.8      

 

 Sodium QN Ser/Plasma  138      

 

 Ast - Sgot  23      

 

 BUN - Urea Nitrogen Ser/Plas  57      

 

 Bilirubin Total Mass/Vol  0.7      

 

 GFR   36      

 

 GFR   44      









 Laboratory test  2020  [facility]  B-Type  221 pg/mL  High  <=
100  



 finding    [address]  Natriuretic        



     [place] [address]  Peptide BNP        



     [phone]          

 

 Comp Metabolic  2020  [facility]  Sodium  139  Normal  135-
145  



 Panel    [address]    mmol/L      



     [place] [address] [phone]          









 Chloride  99 mmol/L  Low  101-111  

 

 Co2 Carbon Dioxide  32 mmol/L  Normal  22-32  

 

 Glucose  73 mg/dL  Normal    

 

 Blood Urea Nitrogen  48 mg/dL  High  6-24  

 

 Creatinine  1.75 mg/dL  High  0.67-1.17  

 

 BUN/Creatinine Ratio  27.4  High  8-20  

 

 Calcium  10.1 mg/dL  Normal  8.6-10.3  

 

 Total Protein  7.3 g/dL  Normal  6.4-8.9  

 

 Albumin  4.4 g/dL  Normal  3.2-5.2  

 

 Globulin  2.9 g/dL  Normal  2-4  

 

 Albumin/Globulin Ratio  1.5  Normal  1-3  

 

 Total Bilirubin  0.80 mg/dL  Normal  0.2-1.0  

 

 Alkaline Phosphatase  93 U/L  Normal    

 

 Alt  24 U/L  Normal  7-52  

 

 Ast  32 U/L  Normal  13-39  

 

 Egfr Non-  37.3    >60  

 

 Egfr   45.2    >60  6

 

 Potassium  6.0 mmol/L  High  3.5-5.0  

 

 Anion Gap  8 mmol/L  Normal  2-11  









 Laboratory test  2020  [facility]  Magnesium  1.8 mg/dL  Low  
1.9-2.7  



 finding    [address]  [place] [address] [phone]          









 Thyroxine  6.85 g/dL  Normal  6.09-12.23  

 

 TSH (Thyroid Stim Horm)  3.53 mcIU/mL  Normal  0.34-5.60  

 

 Free T4 (Free Thyroxine)  1.09 ng/dL  Normal  0.61-1.12  









 INR/Protime  2020  [facility]  INR  2.85  High  0.82-1.09  7



     [address]  [place] [address] [phone]          

 

 INR/Protime  2020  [facility]  INR  3.25  High  0.82-1.09  8



     [address]  [place] [address] [phone]          

 

 INR/Protime  2019  [facility]  INR  2.04  High  0.82-1.09  9



     [address]  [place] [address] [phone]          

 

 INR/Protime  2019  [facility]  INR  2.08  High  0.82-1.09  10



     [address]          



     [place] [address] [phone]          

 

 INR/Protime  2019  [facility]  INR  1.82  High  0.82-1.09  11



     [address]          



     [place] [address]          



     [phone]          

 

 INR/Protime  2019  [facility]  INR  2.33  High  0.82-1.09  12



     [address]          



     [place] [address]          



     [phone]          

 

 INR/Protime  2019  [facility]  INR  2.54  High  0.82-1.09  13



     [address]          



     [place] [address]          



     [phone]          

 

 INR/Protime  10/31/2019  [facility]  INR  1.65  High  0.82-1.09  14



     [address]          



     [place] [address]          



     [phone]          

 

 INR/Protime  10/24/2019  [facility]  INR  1.90  High  0.82-1.09  15



     [address]          



     [place] [address]          



     [phone]          

 

 Protime W/ INR  10/17/2019  [facility]  INR  1.84  High  0.82-1.09  
16



     [address]          



     [place] [address]          



     [phone]          

 

 Protime W/ INR  10/03/2019  [facility]  INR  2.36  High  0.82-1.09  
17



     [address] [place] [address]          



     [phone]          

 

 Coumadin Dx  2019  N2N/CCD Import  Coumadin Dx  Pe      

 

 INR  2019  N2N/CCD Import  INR  1.45 _    -  

 

 INR Goal  2019  N2N/CCD Import  INR Goal  2.0 to      



         3.0      

 

 Recheck INR  2019  N2N/CCD Import  Recheck INR        



         9      









 1  *******Because ethnic data is not always readily available,



   this report includes an eGFR for both -Americans and



   non- Americans.****



   The National Kidney Disease Education Program (NKDEP) does



   not endorse the use of the MDRD equation for patients that



   are not between the ages of 18 and 70, are pregnant, have



   extremes of body size, muscle mass, or nutritional status,



   or are non- or non-.



   According to the National Kidney Foundation, irrespective of



   diagnosis, the stage of the disease is based on the level of



   kidney function:



   Stage Description                      GFR(mL/min/1.73 m(2))



   1     Kidney damage with normal or decreased GFR       90



   2     Kidney damage with mild decrease in GFR          60-89



   3     Moderate decrease in GFR                         30-59



   4     Severe decrease in GFR                           15-29



   5     Kidney failure                       <15 (or dialysis)

 

 2  ORDERED 8.22.19  EXPIRES 2.22.20

 

 3  Standard intensity warfarin therapeutic range: 2.0-3.0



   High intensity warfarin therapeutic range: 2.5-3.5

 

 4  *******Because ethnic data is not always readily available,



   this report includes an eGFR for both -Americans and



   non- Americans.****



   The National Kidney Disease Education Program (NKDEP) does



   not endorse the use of the MDRD equation for patients that



   are not between the ages of 18 and 70, are pregnant, have



   extremes of body size, muscle mass, or nutritional status,



   or are non- or non-.



   According to the National Kidney Foundation, irrespective of



   diagnosis, the stage of the disease is based on the level of



   kidney function:



   Stage Description                      GFR(mL/min/1.73 m(2))



   1     Kidney damage with normal or decreased GFR       90



   2     Kidney damage with mild decrease in GFR          60-89



   3     Moderate decrease in GFR                         30-59



   4     Severe decrease in GFR                           15-29



   5     Kidney failure                       <15 (or dialysis)

 

 5  Standard intensity warfarin therapeutic range: 2.0-3.0



   High intensity warfarin therapeutic range: 2.5-3.5

 

 6  *******Because ethnic data is not always readily available,



   this report includes an eGFR for both -Americans and



   non- Americans.****



   The National Kidney Disease Education Program (NKDEP) does



   not endorse the use of the MDRD equation for patients that



   are not between the ages of 18 and 70, are pregnant, have



   extremes of body size, muscle mass, or nutritional status,



   or are non- or non-.



   According to the National Kidney Foundation, irrespective of



   diagnosis, the stage of the disease is based on the level of



   kidney function:



   Stage Description                      GFR(mL/min/1.73 m(2))



   1     Kidney damage with normal or decreased GFR       90



   2     Kidney damage with mild decrease in GFR          60-89



   3     Moderate decrease in GFR                         30-59



   4     Severe decrease in GFR                           15-29



   5     Kidney failure                       <15 (or dialysis)

 

 7  Standard intensity warfarin therapeutic range: 2.0-3.0



   High intensity warfarin therapeutic range: 2.5-3.5

 

 8  Standard intensity warfarin therapeutic range: 2.0-3.0



   High intensity warfarin therapeutic range: 2.5-3.5

 

 9  Standard intensity warfarin therapeutic range: 2.0-3.0



   High intensity warfarin therapeutic range: 2.5-3.5

 

 10  Standard intensity warfarin therapeutic range: 2.0-3.0



   High intensity warfarin therapeutic range: 2.5-3.5

 

 11  Standard intensity warfarin therapeutic range: 2.0-3.0



   High intensity warfarin therapeutic range: 2.5-3.5

 

 12  Standard intensity warfarin therapeutic range: 2.0-3.0



   High intensity warfarin therapeutic range: 2.5-3.5

 

 13  Standard intensity warfarin therapeutic range: 2.0-3.0



   High intensity warfarin therapeutic range: 2.5-3.5

 

 14  Standard intensity warfarin therapeutic range: 2.0-3.0



   High intensity warfarin therapeutic range: 2.5-3.5

 

 15  Standard intensity warfarin therapeutic range: 2.0-3.0



   High intensity warfarin therapeutic range: 2.5-3.5

 

 16  Standard intensity warfarin therapeutic range: 2.0-3.0



   High intensity warfarin therapeutic range: 2.5-3.5

 

 17  Standard intensity warfarin therapeutic range: 2.0-3.0



   High intensity warfarin therapeutic range: 2.5-3.5







Procedures







 Date  Code  Description  Status

 

 2020  13843  Anticoagulant MGMT For Patient Taking Warfarin, Inc Review  
Completed



     & Intr  

 

 2020  51445  Remove Impact Cerumen Irrigati  Completed

 

 2020  39516  Anticoagulant MGMT For Patient Taking Warfarin, Inc Review  
Completed



     & Intr  

 

 2020  98202  Remove Impact Cerumen Irrigati  Completed

 

 2020  70928  Remove Impact Cerumen Irrigati  Completed

 

 2020  49581  Anticoagulant MGMT For Patient Taking Warfarin, Inc Review  
Completed



     & Intr  

 

 2020  60064  Remove Impact Cerumen Irrigati  Completed

 

 2020  00635  Anticoagulant MGMT For Patient Taking Warfarin, Inc Review  
Completed



     & Intr  

 

 2020  91045  Anticoagulant MGMT For Patient Taking Warfarin, Inc Review  
Completed



     & Intr  

 

 2020  55658  Anticoagulant MGMT For Patient Taking Warfarin, Inc Review  
Completed



     & Intr  

 

 2019  15253  Anticoagulant MGMT For Patient Taking Warfarin, Inc Review  
Completed



     & Intr  

 

 12/10/2019  24875  Anticoagulant MGMT For Patient Taking Warfarin, Inc Review  
Completed



     & Intr  

 

 2019  21791  Anticoagulant MGMT For Patient Taking Warfarin, Inc Review  
Completed



     & Intr  

 

 2019  26959  Anticoagulant MGMT For Patient Taking Warfarin, Inc Review  
Completed



     & Intr  

 

 2019  37101  Anticoagulant MGMT For Patient Taking Warfarin, Inc Review  
Completed



     & Intr  

 

 10/25/2019  72418  Anticoagulant MGMT For Patient Taking Warfarin, Inc Review  
Completed



     & Intr  

 

 2019  00679  Anticoagulant MGMT For Patient Taking Warfarin, Inc Review  
Completed



     & Intr  







[employer]







 Description

 

 No Information Available







Encounters







 Type  Date  Location  Provider  Dx  Diagnosis

 

 Office Visit  2020  [facility]  Carin Castellanos MD  H61.23  Impacted 
cerumen,



   3:45p        bilateral

 

 Office Visit  2020  [facility] Darron Castellanos MD  I10  Essential (primary
)



   3:00p        hypertension









 J30.89  Other allergic rhinitis

 

 Z23  Encounter for immunization







Assessments







 Date  Code  Description  Provider

 

 2020  I48.19  Other persistent atrial fibrillation  Nurse

 

 2020  H61.23  Impacted cerumen, bilateral  Nurse

 

 2020  H61.23  Impacted cerumen, bilateral  Carin Castellanos MD

 

 2020  I10  Essential (primary) hypertension  Carin Castellanos MD

 

 2020  J30.89  Other allergic rhinitis  Carin Castellanos MD

 

 2020  Z23  Encounter for immunization  Carin Castellanos MD

 

 2019  I48.19  Other persistent atrial fibrillation  Subhash Rod MD







Plan of Treatment

No Information Available



Functional Status







 Description

 

 No Information Available







Mental Status







 Description

 

 No Information Available







Referrals







 Description

 

 No Information Available

## 2020-03-11 NOTE — XMS REPORT
Continuity of Care Document (CCD)

 Created on:2020



Patient:Jorge Castañeda

Sex:Male

:1935

External Reference #:[id number]





Demographics







 Address  [address] [place] [address]

 

 Mobile Phone  [phone]

 

 Email Address  [email or web address]

 

 Preferred Language  en

 

 Marital Status  Not  or 

 

 Uatsdin Affiliation  Unknown

 

 Race  White

 

 Ethnic Group  Not  or 









Author







 Name  Gisela Amor NP (transmitted by agent of provider Amrita Hurtado)

 

 Address  [address] [place] [id number]









Care Team Providers







 Name  Role  Phone

 

 Brock Engle MD - Cardiovascular  Care Team Information   [phone]
[phone]



 Disease    

 

 Carin Castellanos M.D. - Family  Care Team Information   [phone]
[phone]



 Medicine    









Problems







 Active Problems  Provider  Date

 

 Atrial fibrillation  Taylor Gerber M.D.  Onset: 2015

 

 Essential hypertension  Taylor Gerber M.D.  Onset: 2015

 

 Tricuspid valve disorder, non-rheumatic  Taylor Gerber M.D.  Onset: 2015

 

 Chronic atrial fibrillation  Taylor Gerber M.D.  Onset: 2016

 

 Chronic obstructive lung disease  Nissa Beasley MD  Onset: 10/04/2016

 

 Cardiomyopathy  Taylor Gerber M.D.  Onset: 10/19/2017

 

 Mitral valve disorder  Taylor Gerber M.D.  Onset: 2017

 

 Pulmonary hypertension due to left heart disease  Taylor Gerber M.D.  Onset: 

 

 Premature beats  Taylor Gerber M.D.  Onset: 2018

 

 Paroxysmal atrial fibrillation  Taylor Gerber M.D.  Onset: 2018

 

 Cardiac pacemaker in situ  Taylor Gerber M.D.  Onset: 2018

 

 Pulmonary hypertension due to left heart disease  Taylor Gerber M.D.  Onset: 

 

 Chronic combined systolic and diastolic heart  Taylor Gerber M.D.  Onset: 



 failure    







Social History







 Type  Date  Description  Comments

 

 Birth Sex    Unknown  

 

 Tobacco Use  Start: Unknown End:  Former Cigarette Smoker  



   Unknown    

 

 Smoking Status  Reviewed: 20  Former Cigarette Smoker  

 

 ETOH Use    Vodka  4oz per day

 

 Recreational Drug Use    Denies Drug Use  

 

 Tobacco Use  Start: Unknown End:  Patient is a former  quit in 



   Unknown  smoker  

 

 Exercise Type/Frequency    Exercises regularly  5 days/week at gym







Allergies, Adverse Reactions, Alerts







 Active Allergies  Reaction  Severity  Comments  Date

 

 Rythmol  dizziness  Moderate    10/03/2012

 

 Metoprolol Succinate        2018







Medications







 Active Medications  SIG  Qnty  Indications  Ordering  Date



         Provider  

 

 Lisinopril  1 by mouth every  90tabs  I42.9  Gisela Amor,  2020



          2.5mg  day      NP  



 Tablets          



           

 

 Furosemide  1/2 tablet by  60tabs    Gisela Amor,  2018



          20mg Tablets  mouth daily      NP  



           

 

 Pulse Oximeter  use as instructed  1units  R09.02  [facility] [place],  2017



               Misc        MD  



           

 

 Oxygen  please use o2 at  1units    [facility] [place],  2017



       Misc  1L/min during      MD  



   exertion        

 

 Multivitamins  1 tablet po daily      Unknown  

 

 Simvastatin  1 by mouth every  90tabs    Taylor Gerber,  



           20mg  day      M.D.  



 Tablets          



           

 

 Warfarin  2 tablet for 3      Unknown  



        2mg Tablets  days and 1 tablet        



   4 days as directed        



   ( adjustment Dr. Curiel)        

 

 Timolol Maleate  1 drop per eye 3      Unknown  



               0.25%  times a day        



 Solution          



           

 

 Pilocarpine HCL  1 gtt in both eyes      Unknown  



               1%  1 x month        



 Solution          



           







Medications Administered in Office







 Medication  SIG  Qnty  Indications  Ordering Provider  Date

 

 Inj, Regadenoson, 0.1 MG        Jus Coles M.D.,  2015



                  Injection        CAMRYN GALINDONC  



           

 

 Inj, Regadenoson, 0.1 MG        Taylor Gerber M.D.  2015



                  Injection          



           

 

 Technetium TC 99M        Jus Coles M.D.,  2015



 Tetrofosmin, Per Unit Dose        SEJAL GALINDO  



 Up To 40 Millicuries          



              Injection          



           

 

 Technetium TC 99M        Taylor Gerber M.D.  2015



 Tetrofosmin, Per Unit Dose          



 Up To 40 Millicuries          



              Injection          



           







Immunizations







 CPT Code  Status  Date  Vaccine  Lot #

 

 [id number]  Given  10/04/2016  Influenza Virus Vaccine, Quadrivalent, Split,  
cp360kg



       Preservative Free  







Vital Signs







 Date  Vital  Result  Comment

 

 2020  2:30pm  Height  67 inches  5'7"









 Weight  173.00 lb  with shoes

 

 Heart Rate  74 /min  

 

 BP Systolic Sitting  110 mmHg  Lue reg cuff

 

 BP Diastolic Sitting  70 mmHg  Lue reg cuff

 

 BP Systolic Standing  116 mmHg  Lue reg cuff

 

 BP Diastolic Standing  74 mmHg  Lue reg cuff

 

 Respiratory Rate  15 /min  

 

 BMI (Body Mass Index)  27.1 kg/m2  

 

 Ejection Fraction  45-50%  date 20 ECHO









 2020  1:32pm  Height  67 inches  5'7"









 Weight  168.00 lb  with shoes

 

 Heart Rate  61 /min  

 

 BP Systolic Sitting  104 mmHg  Ra

 

 BP Diastolic Sitting  66 mmHg  Ra

 

 BP Systolic Standing  104 mmHg  Ra

 

 BP Diastolic Standing  76 mmHg  Ra

 

 BMI (Body Mass Index)  26.3 kg/m2  

 

 Ejection Fraction  45-50%  Echo 2020







Results







 Test  Acquired Date  Facility  Test  Result  H/L  Range  Note

 

 Order  2020  [facility]  Holter  <pending>      



     [address]  Monitor        



     [place] [address] [phone]          

 

 Comp Metabolic  2020  [facility]  Sodium  139 mmol/L  Normal  
135-145  



 Panel    [address]          



     [place] [address] [phone]          









 Chloride  99 mmol/L  Low  101-111  

 

 Co2 Carbon Dioxide  32 mmol/L  Normal  22-32  

 

 Glucose  73 mg/dL  Normal    

 

 Blood Urea Nitrogen  48 mg/dL  High  6-24  

 

 Creatinine  1.75 mg/dL  High  0.67-1.17  

 

 BUN/Creatinine Ratio  27.4  High  8-20  

 

 Calcium  10.1 mg/dL  Normal  8.6-10.3  

 

 Total Protein  7.3 g/dL  Normal  6.4-8.9  

 

 Albumin  4.4 g/dL  Normal  3.2-5.2  

 

 Globulin  2.9 g/dL  Normal  2-4  

 

 Albumin/Globulin Ratio  1.5  Normal  1-3  

 

 Total Bilirubin  0.80 mg/dL  Normal  0.2-1.0  

 

 Alkaline Phosphatase  93 U/L  Normal    

 

 Alt  24 U/L  Normal  7-52  

 

 Ast  32 U/L  Normal  13-39  

 

 Egfr Non-  37.3    >60  

 

 Egfr   45.2    >60  1

 

 Potassium  6.0 mmol/L  High  3.5-5.0  

 

 Anion Gap  8 mmol/L  Normal  2-11  









 Laboratory test  2020  [facility]  Magnesium  1.8 mg/dL  Low  
1.9-2.7  



 finding    [address]          



     [place] [address] [phone]          









 B-Type Natriuretic Peptide BNP  221 pg/mL  High  <=100  









 Thyroid  2020  [facility]  Free T4 (Free  1.09  Normal  0.61-
1.12  



 Panel    [address]  Thyroxine)  ng/dL      



     [place] [address] [phone]          









 Thyroxine  6.85 g/dL  Normal  6.09-12.23  

 

 TSH (Thyroid Stim Horm)  3.53 mcIU/mL  Normal  0.34-5.60  









 1  *******Because ethnic data is not always readily available,



   this report includes an eGFR for both -Americans and



   non- Americans.****



   The National Kidney Disease Education Program (NKDEP) does



   not endorse the use of the MDRD equation for patients that



   are not between the ages of 18 and 70, are pregnant, have



   extremes of body size, muscle mass, or nutritional status,



   or are non- or non-.



   According to the National Kidney Foundation, irrespective of



   diagnosis, the stage of the disease is based on the level of



   kidney function:



   Stage Description                      GFR(mL/min/1.73 m(2))



   1     Kidney damage with normal or decreased GFR       90



   2     Kidney damage with mild decrease in GFR          60-89



   3     Moderate decrease in GFR                         30-59



   4     Severe decrease in GFR                           15-29



   5     Kidney failure                       <15 (or dialysis)







Procedures







 Date  Code  Description  Status

 

 2020  60589  Holter Monitor Review (24 hr)dr rucker & interp only  
Completed

 

 2020  76782  ECG Monitor/Recording W/Visual Superimposition Scanning  
Completed

 

 2020  24255  EKG Tracing & Interpretation  Completed

 

 2020  07726  ECHO Transthoracic, Real-Time 2D With Doppler And Color  
Completed



     Flow  

 

 2019  87414  Icd Eval Sing,Dual,Multi Lead Remote Recpt Transm Tech Rev  
Completed



     Tech S  

 

 2019  64613  Icd Eval Sing,Dual,Multi Lead Remote Recpt Transm Tech Rev  
Completed



     Tech S  

 

 2019  94667  Pacemaker Check Remote Up To 90Days Single,Dual,Multiple  
Completed



     Lead  

 

 2019  13470  Pacemaker Check Remote Up To 90Days Single,Dual,Multiple  
Completed



     Lead  

 

 2019  70322  Icd Eval Sing,Dual,Multi Lead Remote Recpt Transm Tech Rev  
Completed



     Tech S  

 

 2019  98448  Icd Eval Sing,Dual,Multi Lead Remote Recpt Transm Tech Rev  
Completed



     Tech S  

 

 2019  25989  Pacemaker Check Remote Up To 90Days Single,Dual,Multiple  
Completed



     Lead  

 

 2019  06128  Pacemaker Check Remote Up To 90Days Single,Dual,Multiple  
Completed



     Lead  







Medical Devices







 Description

 

 No Information Available







Encounters







 Type  Date  Location  Provider  Dx  Diagnosis

 

 Office Visit  2020  [facility]  Gisela Thuman,  I49.3  
Ventricular premature



   2:00p  Of [facility]  NP    depolarization









 I42.9  Cardiomyopathy, unspecified

 

 I48.21  Permanent atrial fibrillation

 

 Z95.0  Presence of cardiac pacemaker









 Office Visit  2020  1:45p  [facility]  Nissa LOPES.Kaylee  Chronic



     [facility]    obstructive



     [facility]      pulmonary



           disease,



           unspecified









 R09.02  Hypoxemia







Assessments







 Date  Code  Description  Provider

 

 2020  I42.9  Cardiomyopathy, unspecified  Gisela Thuman, NP

 

 2020  I48.91  Unspecified atrial fibrillation  Gisela Thuman, NP

 

 2020  I49.3  Ventricular premature depolarization  Gisela Thuman, NP

 

 2020  Z95.0  Presence of cardiac pacemaker  Gisela Thuman, NP

 

 2020  I49.3  Ventricular premature depolarization  Nurse Visit [facility]

 

 2020  I48.91  Unspecified atrial fibrillation  Taylor Gerber M.D.

 

 2020  I49.3  Ventricular premature depolarization  Gisela Thuman, NP

 

 2020  I42.9  Cardiomyopathy, unspecified  Gisela Thuman, NP

 

 2020  I48.21  Permanent atrial fibrillation  Gisela Thuman, NP

 

 2020  Z95.0  Presence of cardiac pacemaker  Gisela Thuman, NP

 

 2020  J44.9  Chronic obstructive pulmonary disease,  Nissa Beasley MD



     unspecified  

 

 2020  R09.02  Hypoxemia  Nissa Beasley MD

 

 2020  I42.9  Cardiomyopathy, unspecified  Taylor Gerber M.D.

 

 2020  I42.9  Cardiomyopathy, unspecified  Traveling ECHO 2

 

 2020  I48.21  Permanent atrial fibrillation  Traveling ECHO 2

 

 2020  I49.3  Ventricular premature depolarization  Traveling ECHO 2

 

 2019  I48.21  Permanent atrial fibrillation  Taylor Gerber M.D.

 

 2019  I48.21  Permanent atrial fibrillation  Remote Device Checks

 

 2019  Z95.0  Presence of cardiac pacemaker  Taylor Gerber M.D.

 

 2019  Z95.0  Presence of cardiac pacemaker  Remote Device Checks

 

 2019  I48.2  Chronic atrial fibrillation  Taylor Gerber M.D.

 

 2019  I48.2  Chronic atrial fibrillation  Remote Device Checks

 

 2019  Z95.0  Presence of cardiac pacemaker  Taylor Gerber M.D.

 

 2019  Z95.0  Presence of cardiac pacemaker  Remote Device Checks







Plan of Treatment

Future Appointment(s):2020  3:20 pm - Taylor Gerber M.D. at [facility]2021  3:00 pm - Anh Mckoy NP at [facility]2020  2:00 pm - Jarad Singer MD at [facility] 
Dermatology AT Lcvklvvc77/18/2020 - Gisela Amor NPI42.9 Cardiomyopathy, 
unspecifiedNew Medication:Lisinopril 2.5 mg - 1 by mouth every dayFollow up:
follow up in 3-4 months with Dr. Dejesusommendations:get non fasting labs 
next week so I can re evaluate your potassium level now that you are back on 
BinutobdliC85.91 Unspecified atrial ugvvkgrwwytiN61.3 Ventricular premature 
xdpjqtdtpjczpfO28.0 Presence of cardiac pacemaker



Functional Status







 Description

 

 No Information Available







Mental Status







 Description

 

 No Information Available







Referrals







 Description

 

 No Information Available

## 2020-03-13 ENCOUNTER — HOSPITAL ENCOUNTER (EMERGENCY)
Dept: HOSPITAL 25 - UCCORT | Age: 85
Discharge: HOME | End: 2020-03-13
Payer: MEDICARE

## 2020-03-13 VITALS — DIASTOLIC BLOOD PRESSURE: 68 MMHG | SYSTOLIC BLOOD PRESSURE: 133 MMHG

## 2020-03-13 DIAGNOSIS — Z79.899: ICD-10-CM

## 2020-03-13 DIAGNOSIS — Z95.0: ICD-10-CM

## 2020-03-13 DIAGNOSIS — I10: ICD-10-CM

## 2020-03-13 DIAGNOSIS — I48.91: ICD-10-CM

## 2020-03-13 DIAGNOSIS — S61.411D: Primary | ICD-10-CM
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
## 2020-03-13 DIAGNOSIS — Z87.891: ICD-10-CM

## 2020-03-13 PROCEDURE — G0463 HOSPITAL OUTPT CLINIC VISIT: HCPCS

## 2020-03-13 PROCEDURE — 99211 OFF/OP EST MAY X REQ PHY/QHP: CPT

## 2020-03-13 NOTE — UC
Skin Complaint HPI





- HPI Summary


HPI Summary: 





per RN triage:


"Pt here 2 days ago for R hand injury, wife reports the hand is more painful, 

red, swollen, and painful than when he was here 2 days ago. Pt has been 

applying topical abx ointment but wife reports "it looked puffy"."


-reviewed note from earlier this week.


-open wound extensor hand. there was yellow clear discharge from it when 

uncovered this monring and concerned


-has kept it open all day an dhas  not ahd any more drainage. 


-wife is concerned bc of the appearance of the open wound/flesh and yellowish 

coloration and scabbing occuring


-bleeding is controlled.


-no fevers/chills.





- History of Current Complaint


Chief Complaint: UCUpperExtremity


Time Seen by Provider: 03/13/20 16:05


Stated Complaint: RE-CK RIGHT HAND COMPLAINT


Pain Intensity: 0





- Allergy/Home Medications


Allergies/Adverse Reactions: 


 Allergies











Allergy/AdvReac Type Severity Reaction Status Date / Time


 


propafenone Allergy  Dizziness Verified 03/13/20 15:53











Home Medications: 


 Home Medications





Multivitamin [Multivitamins] 1 tab PO QPM 07/17/13 [History Confirmed 03/13/20]


Timolol 0.25% OPHTH.SOLN* [Timoptic Ophth.soln 0.25%*] 1 drop BOTH EYES BID 07/ 17/13 [History Confirmed 03/13/20]


Warfarin TAB(*) [Coumadin TAB(*)] 2 mg PO DAILY 06/20/14 [History Confirmed 03/ 13/20]


Furosemide TAB* [Lasix TAB*] 2 tab PO DAILY 09/01/15 [History Confirmed 03/13/20

]


Simvastatin 20 mg PO QAM 07/12/18 [History Confirmed 03/13/20]


Oxygen 2 % BEDTIME 02/23/19 [History Confirmed 03/13/20]


Pilocarpine 4% OPTH.SOL* 1 drop .SEE ORDER MONTHLY 02/23/19 [History Confirmed 

03/13/20]


lisinopriL [Lisinopril 2.5 MG-] 0.5 tab PO DAILY 03/11/20 [History Confirmed 03/ 13/20]











PMH/Surg Hx/FS Hx/Imm Hx


Previously Healthy: Yes


Cardiovascular History: Hypertension, Pacemaker/ICD, Atrial Fibrillation





- Surgical History


Surgical History: Yes


Surgery Procedure, Year, and Place: 2013-Right MENISCUS REPAIR-2013 [place].  

2009- SURGERY BLADDER CANCER.  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  kidney stone 

removal.  PACEMAKER INSERTION





- Family History


Known Family History: Positive: Cardiac Disease, Hypertension





- Social History


Alcohol Use: Daily


Alcohol Amount: cocktail x1 nightly


Substance Use Type: None


Smoking Status (MU): Former Smoker


Length of Time of Smoking/Using Tobacco: 1-2 PPD x 25 Years


Have You Smoked in the Last Year: No


When Did the Patient Quit Smoking/Using Tobacco: 1985





- Immunization History


Most Recent Tetanus Shot: UNKNOWN





Review of Systems


All Other Systems Reviewed And Are Negative: Yes


Constitutional: Positive: Negative.  Negative: Fever, Chills


Skin: Positive: Other - see above


Eyes: Positive: Negative


ENT: Positive: Negative


Respiratory: Positive: Negative.  Negative: Cough


Cardiovascular: Positive: Negative


Gastrointestinal: Positive: Negative


Genitourinary: Positive: Negative


Motor: Positive: Negative


Neurovascular: Positive: Negative


Musculoskeletal: Positive: Negative


Neurological/Mental Status: Positive: Negative


Psychological: Positive: Negative


Is Patient Immunocompromised?: No





Physical Exam


Triage Information Reviewed: Yes


Appearance: Well-Appearing, No Pain Distress, Well-Nourished


Vital Signs: 


 Initial Vital Signs











Temp  97.8 F   03/13/20 15:53


 


Pulse  60   03/13/20 15:53


 


Resp  16   03/13/20 15:53


 


BP  133/68   03/13/20 15:53


 


Pulse Ox  95   03/13/20 15:53











Vital Signs Reviewed: Yes


Eye Exam: Normal


ENT Exam: Normal


ENT: Positive: Pharynx normal.  Negative: Nasal congestion


Neck exam: Normal


Respiratory Exam: Normal


Cardiovascular Exam: Normal


Musculoskeletal Exam: Normal


Neurological Exam: Normal


Psychological Exam: Normal


Skin: Positive: Other - rt extensor hand w/ ~ 3 cm open wound that is healing 

nicely w/ good granultaion tissue. there is mild swelling in extensor right hand





Course/Dx





- Course


Course Of Treatment: 





rt hand wound, several days old. appears irritated. wound is healing nicely w/ 

good granulation tissue. no fevers, no discharge seen. cool to touch. no 

bactreial infection at this time. 





- Differential Diagnoses - Skin Complaint


Differential Diagnoses: Cellulitis, Contact Dermatitis, Other - wound





- Diagnoses


Provider Diagnosis: 


 Laceration








Discharge ED





- Sign-Out/Discharge


Documenting (check all that apply): Patient Departure


All imaging exams completed and their final reports reviewed: No Studies





- Discharge Plan


Condition: Stable


Disposition: HOME


Patient Education Materials:  Acute Wound Care (ED)


Referrals: 


Carin Castellanos MD [Primary Care Provider] - 3 Days


Additional Instructions: 


You wound does not look infected at this time. It appears irritated, but the 

wound itself is healing nicely. Contineu with antibiotic ointment but recommend 

covering. Please be re-evaluated sooner with fevers/chills or worsening redness 

or swelling as a bacterial infection may occur. It is not recommended to take 

an antibiotic to prevent an infection. 





- Billing Disposition and Condition


Condition: STABLE


Disposition: Home

## 2020-03-13 NOTE — UC
Upper Extremity HPI





- HPI Summary


HPI Summary: 





per RN triage:


"Pt here 2 days ago for R hand injury, wife reports the hand is more painful, 

red, swollen, and painful than when he was here 2 days ago. Pt has been 

applying topical abx ointment but wife reports "it looked puffy"."





- History of Current Complaint


Chief Complaint: UCUpperExtremity


Stated Complaint: RE-CK RIGHT HAND COMPLAINT


Time Seen by Provider: 03/13/20 16:05


Pain Intensity: 0





- Allergies/Home Medications


Allergies/Adverse Reactions: 


 Allergies











Allergy/AdvReac Type Severity Reaction Status Date / Time


 


propafenone Allergy  Dizziness Verified 03/13/20 15:53











Home Medications: 


 Home Medications





Multivitamin [Multivitamins] 1 tab PO QPM 07/17/13 [History Confirmed 03/13/20]


Timolol 0.25% OPHTH.SOLN* [Timoptic Ophth.soln 0.25%*] 1 drop BOTH EYES BID 07/ 17/13 [History Confirmed 03/13/20]


Warfarin TAB(*) [Coumadin TAB(*)] 2 mg PO DAILY 06/20/14 [History Confirmed 03/ 13/20]


Furosemide TAB* [Lasix TAB*] 2 tab PO DAILY 09/01/15 [History Confirmed 03/13/20

]


Simvastatin 20 mg PO QAM 07/12/18 [History Confirmed 03/13/20]


Oxygen 2 % BEDTIME 02/23/19 [History Confirmed 03/13/20]


Pilocarpine 4% OPTH.SOL* 1 drop .SEE ORDER MONTHLY 02/23/19 [History Confirmed 

03/13/20]


lisinopriL [Lisinopril 2.5 MG-] 0.5 tab PO DAILY 03/11/20 [History Confirmed 03/ 13/20]











PMH/Surg Hx/FS Hx/Imm Hx





- Surgical History


Surgical History: Yes


Surgery Procedure, Year, and Place: 2013-Right MENISCUS REPAIR-2013 [place].  

2009- SURGERY BLADDER CANCER.  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  kidney stone 

removal.  PACEMAKER INSERTION





- Family History


Known Family History: Positive: Cardiac Disease, Hypertension





- Social History


Alcohol Use: Daily


Alcohol Amount: cocktail x1 nightly


Substance Use Type: None


Smoking Status (MU): Former Smoker


Length of Time of Smoking/Using Tobacco: 1-2 PPD x 25 Years


Have You Smoked in the Last Year: No


When Did the Patient Quit Smoking/Using Tobacco: 1985





- Immunization History


Most Recent Tetanus Shot: UNKNOWN





Physical Exam


Vital Signs: 


 Initial Vital Signs











Temp  97.8 F   03/13/20 15:53


 


Pulse  60   03/13/20 15:53


 


Resp  16   03/13/20 15:53


 


BP  133/68   03/13/20 15:53


 


Pulse Ox  95   03/13/20 15:53














Discharge ED





- Discharge Plan


Referrals: 


Carin Castellanos MD [Primary Care Provider] -

## 2022-01-01 NOTE — XMS REPORT
Continuity of Care Document (CCD)

 Created on:2020



Patient:Jorge Castañeda

Sex:Male

:1935

External Reference #:[id number]





Demographics







 Address  [address] [place], [place] [id number]

 

 Home Phone  [phone]

 

 Preferred Language  Unknown

 

 Marital Status  Unknown

 

 Protestant Affiliation  Unknown

 

 Race  Unknown

 

 Ethnic Group  Unknown









Author







 Name  Subhash Rod MD (transmitted by agent of provider [facility])

 

 Address  [address] [place] [id number]









Problems







 Active Problems  Provider  Date

 

 Essential hypertension    Onset: 2019

 

 Multiple premature ventricular complexes    Onset: 2018

 

 Chronic obstructive lung disease    Onset: 2018

 

 Pulmonary hypertension    Onset: 2017

 

 Bilateral hearing loss    Onset: 10/24/2017

 

 History of malignant neoplasm of prostate    Onset: 10/23/2017

 

 Atrial fibrillation    Onset: 2018

 

 Hyperlipidemia    Onset: 10/23/2017

 

 Kidney stone    Onset: 10/23/2017

 

 Cardiac pacemaker in situ  Subhash Rod MD  Onset: 02/10/2020







Social History







 Type  Date  Description  Comments

 

 Birth Sex    Unknown  

 

 Tobacco Use  Start: Unknown End: Unknown  Patient is a former smoker  quit in 


 

 Smoking Status  Reviewed: 20  Patient is a former smoker  quit in 







Allergies, Adverse Reactions, Alerts







 Active Allergies  Reaction  Severity  Comments  Date

 

 Diltiazem  leg edema, malaise.  Moderate    2019

 

 Metoprolol  wheezing, exacerbated COPD  Moderate    2019

 

 Rhythmol  dizziness  Moderate    2019







Medications







 Active Medications  SIG  Qnty  Indications  Ordering  Date



         Provider  

 

 Simvastatin  1  daily Oral  90tabs    Unknown  2019



            20mg          



 Tablets          



           

 

 Warfarin Sodium  Oral; M/W/F 2  90tabs    Unknown  2019



                2.5mg  Pills        



 Tablets          



           

 

 Furosemide  2 by mouth M W F 1  30tabs    Unknown  10/23/2018



           20mg  by mouth T TH Sat        



 Tablets  Sun        



           

 

 Warfarin Sodium  Oral; Take 1  90tabs    Unknown  2018



                2mg  T/TH/Sat/Sun        



 Tablets          



           

 

 Pilocarpine HCL  1 drop 4 times a  15units    Unknown  10/23/2017



                1%  day Ophthalmic        



 Solution          



           

 

 Timolol Maleate  1   Ophthalmic;      Unknown  10/23/2017



 Ophthalmic Gel  both eyes every 12        



 Forming  hours.        



        0.25% GFS          



           







Immunizations







 CPT Code  Status  Date  Vaccine  Lot #

 

 [id number]  Given  2020  Flu High Dose  [id number]

 

 [id number]  Given  10/23/2018  Flu High Dose  

 

 [id number]  Given  10/23/2017  Flu High Dose  







Vital Signs







 Date  Vital  Result  Comment

 

 2020  4:04pm  BP Systolic  116 mmHg  









 BP Diastolic  68 mmHg  

 

 Height  65.5 inches  5'5.50"

 

 Weight  168.00 lb  

 

 Heart Rate  60 /min  

 

 Body Temperature  96.8 F  

 

 O2 % BldC Oximetry  99 %  

 

 BMI (Body Mass Index)  27.5 kg/m2  









 2020  3:06pm  BP Systolic  116 mmHg  









 BP Diastolic  66 mmHg  

 

 Heart Rate  69 /min  

 

 Body Temperature  98.2 F  

 

 O2 % BldC Oximetry  98 %  







Results







 Test  Acquired Date  Facility  Test  Result  H/L  Range  Note

 

 INR/Protime  03/10/2020  [facility]  INR  2.38  High  0.82-1.09  1, 
2



     [address]  [place] [address] [phone]          

 

 Comp Metabolic  2020  [facility]  Sodium  143 mmol/L  Normal  
135-145  



 Panel    [address]  [place], [place] [id number] [phone]          









 Potassium  4.1 mmol/L  Normal  3.5-5.0  

 

 Chloride  102 mmol/L  Normal  101-111  

 

 Co2 Carbon Dioxide  35 mmol/L  High  22-32  

 

 Anion Gap  6 mmol/L  Normal  2-11  

 

 Glucose  92 mg/dL  Normal    

 

 Blood Urea Nitrogen  25 mg/dL  High  6-24  

 

 Creatinine  1.04 mg/dL  Normal  0.67-1.17  

 

 BUN/Creatinine Ratio  24.0  High  8-20  

 

 Calcium  9.9 mg/dL  Normal  8.6-10.3  

 

 Total Protein  7.0 g/dL  Normal  6.4-8.9  

 

 Albumin  4.3 g/dL  Normal  3.2-5.2  

 

 Globulin  2.7 g/dL  Normal  2-4  

 

 Albumin/Globulin Ratio  1.6  Normal  1-3  

 

 Total Bilirubin  0.90 mg/dL  Normal  0.2-1.0  

 

 Alkaline Phosphatase  106 U/L  High    

 

 Alt  20 U/L  Normal  7-52  

 

 Ast  28 U/L  Normal  13-39  

 

 Egfr Non-  68.0    >60  

 

 Egfr   82.3    >60  3









 Comp Metabolic  2020  [facility]  Sodium  143 mmol/L  Normal  
135-145  



 Panel    [address]  [place], [place] [id number] [phone]          









 Potassium  4.1 mmol/L  Normal  3.5-5.0  

 

 Chloride  103 mmol/L  Normal  101-111  

 

 Co2 Carbon Dioxide  34 mmol/L  High  22-32  

 

 Anion Gap  6 mmol/L  Normal  2-11  

 

 Glucose  136 mg/dL  High    

 

 Blood Urea Nitrogen  24 mg/dL  Normal  6-24  

 

 Creatinine  1.12 mg/dL  Normal  0.67-1.17  

 

 BUN/Creatinine Ratio  21.4  High  8-20  

 

 Calcium  9.6 mg/dL  Normal  8.6-10.3  

 

 Total Protein  7.0 g/dL  Normal  6.4-8.9  

 

 Albumin  4.2 g/dL  Normal  3.2-5.2  

 

 Globulin  2.8 g/dL  Normal  2-4  

 

 Albumin/Globulin Ratio  1.5  Normal  1-3  

 

 Total Bilirubin  0.90 mg/dL  Normal  0.2-1.0  

 

 Alkaline Phosphatase  107 U/L  High    

 

 Alt  23 U/L  Normal  7-52  

 

 Ast  31 U/L  Normal  13-39  

 

 Egfr Non-  62.5    >60  

 

 Egfr   75.6    >60  4









 INR/Protime  2020  [facility]  INR  1.78  High  0.82-1.09  5



     [address]          



     [place] [address] [phone]          

 

 Laboratory test  2020  [facility]  CF INR  2.5      



 finding    (   )-   -          

 

 Comp Metabolic  2020  [facility]  Sodium  140 mmol/L  Normal  
135-145  



 Panel    [address] [place] [address] [phone]          









 Potassium  4.4 mmol/L  Normal  3.5-5.0  

 

 Chloride  98 mmol/L  Low  101-111  

 

 Co2 Carbon Dioxide  35 mmol/L  High  22-32  

 

 Anion Gap  7 mmol/L  Normal  2-11  

 

 Glucose  103 mg/dL  High    

 

 Blood Urea Nitrogen  28 mg/dL  High  6-24  

 

 Creatinine  1.15 mg/dL  Normal  0.67-1.17  

 

 BUN/Creatinine Ratio  24.3  High  8-20  

 

 Calcium  9.8 mg/dL  Normal  8.6-10.3  

 

 Total Protein  6.9 g/dL  Normal  6.4-8.9  

 

 Albumin  4.2 g/dL  Normal  3.2-5.2  

 

 Globulin  2.7 g/dL  Normal  2-4  

 

 Albumin/Globulin Ratio  1.6  Normal  1-3  

 

 Total Bilirubin  0.90 mg/dL  Normal  0.2-1.0  

 

 Alkaline Phosphatase  98 U/L  Normal    

 

 Alt  21 U/L  Normal  7-52  

 

 Ast  30 U/L  Normal  13-39  

 

 Egfr Non-  60.6    >60  

 

 Egfr   73.3    >60  6









 INR/Protime  2020  [facility]  INR  2.83  High  0.82-1.09  7



     [address] [place] [address] [phone]          

 

 CMP  01/15/2020  N2N/CCD Import  Albumin QN  3.8      



       Serum/Plasma        









 Alt - SGPT  33      

 

 Calcium Ser/Plasma Mass/Vol  9.3      

 

 Carbon Dioxide QN Ser/Plas  31      

 

 Chloride Serum/Plasma  102      

 

 Creatinine QN Serum MCNC  1.9      

 

 Glucose Serum  90      

 

 Alkaline Phosphatase QN Ser/PL  91      

 

 Potassium QN Ser/PL Mols/Vol  5.1      

 

 Protein Total QN Ser/Plas  7.8      

 

 Sodium QN Ser/Plasma  138      

 

 Ast - Sgot  23      

 

 BUN - Urea Nitrogen Ser/Plas  57      

 

 Bilirubin Total Mass/Vol  0.7      

 

 GFR   36      

 

 GFR   44      









 INR/Protime  2020  [facility]  INR  2.85  High  0.82-1.09  8



     [address]  [place] [address] [phone]          

 

 Laboratory test  2020  [facility]  Magnesium  1.8 mg/dL  Low  
1.9-2.7  



 finding    [address] [place] [address] [phone]          









 Thyroxine  6.85 g/dL  Normal  6.09-12.23  

 

 TSH (Thyroid Stim Horm)  3.53 mcIU/mL  Normal  0.34-5.60  

 

 Free T4 (Free Thyroxine)  1.09 ng/dL  Normal  0.61-1.12  









 Comp Metabolic  2020  [facility]  Sodium  139 mmol/L  Normal  
135-145  



 Panel    [address] [place] [address] [phone]          









 Chloride  99 mmol/L  Low  101-111  

 

 Co2 Carbon Dioxide  32 mmol/L  Normal  22-32  

 

 Glucose  73 mg/dL  Normal    

 

 Blood Urea Nitrogen  48 mg/dL  High  6-24  

 

 Creatinine  1.75 mg/dL  High  0.67-1.17  

 

 BUN/Creatinine Ratio  27.4  High  8-20  

 

 Calcium  10.1 mg/dL  Normal  8.6-10.3  

 

 Total Protein  7.3 g/dL  Normal  6.4-8.9  

 

 Albumin  4.4 g/dL  Normal  3.2-5.2  

 

 Globulin  2.9 g/dL  Normal  2-4  

 

 Albumin/Globulin Ratio  1.5  Normal  1-3  

 

 Total Bilirubin  0.80 mg/dL  Normal  0.2-1.0  

 

 Alkaline Phosphatase  93 U/L  Normal    

 

 Alt  24 U/L  Normal  7-52  

 

 Ast  32 U/L  Normal  13-39  

 

 Egfr Non-  37.3    >60  

 

 Egfr   45.2    >60  9

 

 Potassium  6.0 mmol/L  High  3.5-5.0  

 

 Anion Gap  8 mmol/L  Normal  2-11  









 Laboratory test  2020  [facility]  B-Type  221 pg/mL  High  <=
100  



 finding    [address]  Natriuretic        



     [place] [address]  Peptide BNP        



     [phone]          

 

 INR/Protime  2020  [facility]  INR  3.25  High  0.82-1.09  10



     [address]          



     [place] [address]          



     [phone]          

 

 INR/Protime  2019  [facility]  INR  2.04  High  0.82-1.09  11



     [address]          



     [place] [address]          



     [phone]          

 

 INR/Protime  2019  [facility]  INR  2.08  High  0.82-1.09  12



     [address]          



     [place] [address]          



     [phone]          

 

 INR/Protime  2019  [facility]  INR  1.82  High  0.82-1.09  13



     [address]          



     [place] [address]          



     [phone]          

 

 INR/Protime  2019  [facility]  INR  2.33  High  0.82-1.09  14



     [address]          



     [place] [address]          



     [phone]          

 

 INR/Protime  2019  [facility]  INR  2.54  High  0.82-1.09  15



     [address]          



     [place] [address]          



     [phone]          

 

 INR/Protime  10/31/2019  [facility]  INR  1.65  High  0.82-1.09  16



     [address]          



     [place] [address]          



     [phone]          

 

 INR/Protime  10/24/2019  [facility]  INR  1.90  High  0.82-1.09  17



     [address]          



     [place] [address]          



     [phone]          

 

 Protime W/ INR  10/17/2019  [facility]  INR  1.84  High  0.82-1.09  
18



     [address]          



     [place] [address] [phone]          

 

 Protime W/ INR  10/03/2019  [facility]  INR  2.36  High  0.82-1.09  
19



     [address]          



     [place] [address] [phone]          









 1  ORDERED 8.22.19  EXPIRES 2.22.20

 

 2  Standard intensity warfarin therapeutic range: 2.0-3.0



   High intensity warfarin therapeutic range: 2.5-3.5

 

 3  *******Because ethnic data is not always readily available,



   this report includes an eGFR for both -Americans and



   non- Americans.****



   The National Kidney Disease Education Program (NKDEP) does



   not endorse the use of the MDRD equation for patients that



   are not between the ages of 18 and 70, are pregnant, have



   extremes of body size, muscle mass, or nutritional status,



   or are non- or non-.



   According to the National Kidney Foundation, irrespective of



   diagnosis, the stage of the disease is based on the level of



   kidney function:



   Stage Description                      GFR(mL/min/1.73 m(2))



   1     Kidney damage with normal or decreased GFR       90



   2     Kidney damage with mild decrease in GFR          60-89



   3     Moderate decrease in GFR                         30-59



   4     Severe decrease in GFR                           15-29



   5     Kidney failure                       <15 (or dialysis)

 

 4  *******Because ethnic data is not always readily available,



   this report includes an eGFR for both -Americans and



   non- Americans.****



   The National Kidney Disease Education Program (NKDEP) does



   not endorse the use of the MDRD equation for patients that



   are not between the ages of 18 and 70, are pregnant, have



   extremes of body size, muscle mass, or nutritional status,



   or are non- or non-.



   According to the National Kidney Foundation, irrespective of



   diagnosis, the stage of the disease is based on the level of



   kidney function:



   Stage Description                      GFR(mL/min/1.73 m(2))



   1     Kidney damage with normal or decreased GFR       90



   2     Kidney damage with mild decrease in GFR          60-89



   3     Moderate decrease in GFR                         30-59



   4     Severe decrease in GFR                           15-29



   5     Kidney failure                       <15 (or dialysis)

 

 5  Standard intensity warfarin therapeutic range: 2.0-3.0



   High intensity warfarin therapeutic range: 2.5-3.5

 

 6  *******Because ethnic data is not always readily available,



   this report includes an eGFR for both -Americans and



   non- Americans.****



   The National Kidney Disease Education Program (NKDEP) does



   not endorse the use of the MDRD equation for patients that



   are not between the ages of 18 and 70, are pregnant, have



   extremes of body size, muscle mass, or nutritional status,



   or are non- or non-.



   According to the National Kidney Foundation, irrespective of



   diagnosis, the stage of the disease is based on the level of



   kidney function:



   Stage Description                      GFR(mL/min/1.73 m(2))



   1     Kidney damage with normal or decreased GFR       90



   2     Kidney damage with mild decrease in GFR          60-89



   3     Moderate decrease in GFR                         30-59



   4     Severe decrease in GFR                           15-29



   5     Kidney failure                       <15 (or dialysis)

 

 7  Standard intensity warfarin therapeutic range: 2.0-3.0



   High intensity warfarin therapeutic range: 2.5-3.5

 

 8  Standard intensity warfarin therapeutic range: 2.0-3.0



   High intensity warfarin therapeutic range: 2.5-3.5

 

 9  *******Because ethnic data is not always readily available,



   this report includes an eGFR for both -Americans and



   non- Americans.****



   The National Kidney Disease Education Program (NKDEP) does



   not endorse the use of the MDRD equation for patients that



   are not between the ages of 18 and 70, are pregnant, have



   extremes of body size, muscle mass, or nutritional status,



   or are non- or non-.



   According to the National Kidney Foundation, irrespective of



   diagnosis, the stage of the disease is based on the level of



   kidney function:



   Stage Description                      GFR(mL/min/1.73 m(2))



   1     Kidney damage with normal or decreased GFR       90



   2     Kidney damage with mild decrease in GFR          60-89



   3     Moderate decrease in GFR                         30-59



   4     Severe decrease in GFR                           15-29



   5     Kidney failure                       <15 (or dialysis)

 

 10  Standard intensity warfarin therapeutic range: 2.0-3.0



   High intensity warfarin therapeutic range: 2.5-3.5

 

 11  Standard intensity warfarin therapeutic range: 2.0-3.0



   High intensity warfarin therapeutic range: 2.5-3.5

 

 12  Standard intensity warfarin therapeutic range: 2.0-3.0



   High intensity warfarin therapeutic range: 2.5-3.5

 

 13  Standard intensity warfarin therapeutic range: 2.0-3.0



   High intensity warfarin therapeutic range: 2.5-3.5

 

 14  Standard intensity warfarin therapeutic range: 2.0-3.0



   High intensity warfarin therapeutic range: 2.5-3.5

 

 15  Standard intensity warfarin therapeutic range: 2.0-3.0



   High intensity warfarin therapeutic range: 2.5-3.5

 

 16  Standard intensity warfarin therapeutic range: 2.0-3.0



   High intensity warfarin therapeutic range: 2.5-3.5

 

 17  Standard intensity warfarin therapeutic range: 2.0-3.0



   High intensity warfarin therapeutic range: 2.5-3.5

 

 18  Standard intensity warfarin therapeutic range: 2.0-3.0



   High intensity warfarin therapeutic range: 2.5-3.5

 

 19  Standard intensity warfarin therapeutic range: 2.0-3.0



   High intensity warfarin therapeutic range: 2.5-3.5







Procedures







 Date  Code  Description  Status

 

 2020  14646  Anticoagulant MGMT For Patient Taking Warfarin, Inc Review  
Completed



     & Intr  

 

 2020  89519  Anticoagulant MGMT For Patient Taking Warfarin, Inc Review  
Completed



     & Intr  

 

 2020  00202  Remove Impact Cerumen Irrigati  Completed

 

 2020  04589  Anticoagulant MGMT For Patient Taking Warfarin, Inc Review  
Completed



     & Intr  

 

 2020  60103  Remove Impact Cerumen Irrigati  Completed

 

 2020  90651  Remove Impact Cerumen Irrigati  Completed

 

 2020  66077  Anticoagulant MGMT For Patient Taking Warfarin, Inc Review  
Completed



     & Intr  

 

 2020  41886  Remove Impact Cerumen Irrigati  Completed

 

 2020  68381  Anticoagulant MGMT For Patient Taking Warfarin, Inc Review  
Completed



     & Intr  

 

 2020  49546  Anticoagulant MGMT For Patient Taking Warfarin, Inc Review  
Completed



     & Intr  

 

 2020  86750  Anticoagulant MGMT For Patient Taking Warfarin, Inc Review  
Completed



     & Intr  

 

 2019  55512  Anticoagulant MGMT For Patient Taking Warfarin, Inc Review  
Completed



     & Intr  

 

 12/10/2019  33405  Anticoagulant MGMT For Patient Taking Warfarin, Inc Review  
Completed



     & Intr  

 

 2019  50388  Anticoagulant MGMT For Patient Taking Warfarin, Inc Review  
Completed



     & Intr  

 

 2019  48291  Anticoagulant MGMT For Patient Taking Warfarin, Inc Review  
Completed



     & Intr  

 

 2019  08449  Anticoagulant MGMT For Patient Taking Warfarin, Inc Review  
Completed



     & Intr  

 

 10/25/2019  74891  Anticoagulant MGMT For Patient Taking Warfarin, Inc Review  
Completed



     & Intr  







Medical Devices







 Description

 

 No Information Available







Encounters







 Type  Date  Location  Provider  Dx  Diagnosis

 

 Office Visit  2020  [facility]  Carin Castellanos MD  H61.23  Impacted 
cerumen,



   3:45p        bilateral

 

 Office Visit  2020  [facility]  Carin Castellanos MD  I10  Essential (primary
)



   3:00p        hypertension









 J30.89  Other allergic rhinitis

 

 Z23  Encounter for immunization







Assessments







 Date  Code  Description  Provider

 

 2020  I48.19  Other persistent atrial fibrillation  Subhash Rod MD

 

 2020  I48.19  Other persistent atrial fibrillation  Nurse

 

 2020  H61.23  Impacted cerumen, bilateral  Nurse

 

 2020  H61.23  Impacted cerumen, bilateral  Carin Castellanos MD

 

 2020  I10  Essential (primary) hypertension  Carin Castellanos MD

 

 2020  J30.89  Other allergic rhinitis  Carin Castellanos MD

 

 2020  Z23  Encounter for immunization  Carin Castellanos MD

 

 2019  I48.19  Other persistent atrial fibrillation  Subhash Rod MD







Plan of Treatment

No Information Available



Functional Status







 Description

 

 No Information Available







Mental Status







 Description

 

 No Information Available







Referrals







 Description

 

 No Information Available
Statement Selected